# Patient Record
Sex: FEMALE | Race: WHITE | NOT HISPANIC OR LATINO | Employment: OTHER | URBAN - NONMETROPOLITAN AREA
[De-identification: names, ages, dates, MRNs, and addresses within clinical notes are randomized per-mention and may not be internally consistent; named-entity substitution may affect disease eponyms.]

---

## 2018-01-18 ENCOUNTER — TRANSCRIBE ORDERS (OUTPATIENT)
Dept: ADMINISTRATIVE | Facility: HOSPITAL | Age: 53
End: 2018-01-18

## 2018-01-18 DIAGNOSIS — R92.8 ABNORMALITY OF LEFT BREAST ON SCREENING MAMMOGRAM: Primary | ICD-10-CM

## 2022-12-08 ENCOUNTER — LAB (OUTPATIENT)
Dept: LAB | Facility: HOSPITAL | Age: 57
End: 2022-12-08

## 2022-12-08 ENCOUNTER — OFFICE VISIT (OUTPATIENT)
Dept: INTERNAL MEDICINE | Facility: CLINIC | Age: 57
End: 2022-12-08

## 2022-12-08 VITALS
TEMPERATURE: 97.1 F | OXYGEN SATURATION: 98 % | SYSTOLIC BLOOD PRESSURE: 132 MMHG | WEIGHT: 153.4 LBS | RESPIRATION RATE: 16 BRPM | DIASTOLIC BLOOD PRESSURE: 66 MMHG | HEART RATE: 92 BPM | HEIGHT: 65 IN | BODY MASS INDEX: 25.56 KG/M2

## 2022-12-08 DIAGNOSIS — F32.A DEPRESSION, UNSPECIFIED DEPRESSION TYPE: ICD-10-CM

## 2022-12-08 DIAGNOSIS — E55.9 VITAMIN D DEFICIENCY: ICD-10-CM

## 2022-12-08 DIAGNOSIS — E11.9 TYPE 2 DIABETES MELLITUS WITHOUT COMPLICATION, WITH LONG-TERM CURRENT USE OF INSULIN: ICD-10-CM

## 2022-12-08 DIAGNOSIS — Z12.31 SCREENING MAMMOGRAM, ENCOUNTER FOR: ICD-10-CM

## 2022-12-08 DIAGNOSIS — B96.89 ACUTE BACTERIAL BRONCHITIS: ICD-10-CM

## 2022-12-08 DIAGNOSIS — Z23 NEED FOR COVID-19 VACCINE: ICD-10-CM

## 2022-12-08 DIAGNOSIS — Z00.00 HEALTH CARE MAINTENANCE: ICD-10-CM

## 2022-12-08 DIAGNOSIS — J20.8 ACUTE BACTERIAL BRONCHITIS: ICD-10-CM

## 2022-12-08 DIAGNOSIS — Z12.11 ENCOUNTER FOR SCREENING COLONOSCOPY: ICD-10-CM

## 2022-12-08 DIAGNOSIS — Z79.4 TYPE 2 DIABETES MELLITUS WITHOUT COMPLICATION, WITH LONG-TERM CURRENT USE OF INSULIN: ICD-10-CM

## 2022-12-08 DIAGNOSIS — R25.2 LEG CRAMPS: ICD-10-CM

## 2022-12-08 DIAGNOSIS — G47.00 INSOMNIA, UNSPECIFIED TYPE: ICD-10-CM

## 2022-12-08 DIAGNOSIS — Z01.00 ENCOUNTER FOR DIABETES TYPE 2 EYE EXAM: ICD-10-CM

## 2022-12-08 DIAGNOSIS — N39.0 ACUTE UTI: ICD-10-CM

## 2022-12-08 DIAGNOSIS — F19.11 HISTORY OF DRUG ABUSE IN REMISSION: ICD-10-CM

## 2022-12-08 DIAGNOSIS — K21.9 GASTROESOPHAGEAL REFLUX DISEASE, UNSPECIFIED WHETHER ESOPHAGITIS PRESENT: ICD-10-CM

## 2022-12-08 DIAGNOSIS — I10 PRIMARY HYPERTENSION: Primary | ICD-10-CM

## 2022-12-08 DIAGNOSIS — Z23 NEED FOR SHINGLES VACCINE: ICD-10-CM

## 2022-12-08 DIAGNOSIS — Z12.4 ENCOUNTER FOR PAPANICOLAOU SMEAR OF CERVIX: ICD-10-CM

## 2022-12-08 DIAGNOSIS — R11.0 CHRONIC NAUSEA: ICD-10-CM

## 2022-12-08 DIAGNOSIS — Z23 NEED FOR INFLUENZA VACCINATION: ICD-10-CM

## 2022-12-08 DIAGNOSIS — E11.9 ENCOUNTER FOR DIABETES TYPE 2 EYE EXAM: ICD-10-CM

## 2022-12-08 DIAGNOSIS — Z23 NEED FOR TDAP VACCINATION: ICD-10-CM

## 2022-12-08 LAB
ALBUMIN SERPL-MCNC: 4.5 G/DL (ref 3.5–5.2)
ALBUMIN/GLOB SERPL: 1.2 G/DL
ALP SERPL-CCNC: 149 U/L (ref 39–117)
ALT SERPL W P-5'-P-CCNC: 9 U/L (ref 1–33)
ANION GAP SERPL CALCULATED.3IONS-SCNC: 11 MMOL/L (ref 5–15)
AST SERPL-CCNC: 24 U/L (ref 1–32)
BASOPHILS # BLD AUTO: 0.06 10*3/MM3 (ref 0–0.2)
BASOPHILS NFR BLD AUTO: 0.8 % (ref 0–1.5)
BILIRUB BLD-MCNC: ABNORMAL MG/DL
BILIRUB SERPL-MCNC: 0.2 MG/DL (ref 0–1.2)
BUN SERPL-MCNC: 17 MG/DL (ref 6–20)
BUN/CREAT SERPL: 21 (ref 7–25)
CALCIUM SPEC-SCNC: 10 MG/DL (ref 8.6–10.5)
CHLORIDE SERPL-SCNC: 102 MMOL/L (ref 98–107)
CHOLEST SERPL-MCNC: 244 MG/DL (ref 0–200)
CLARITY, POC: CLEAR
CO2 SERPL-SCNC: 24 MMOL/L (ref 22–29)
COLOR UR: ABNORMAL
CREAT SERPL-MCNC: 0.81 MG/DL (ref 0.57–1)
DEPRECATED RDW RBC AUTO: 37.9 FL (ref 37–54)
EGFRCR SERPLBLD CKD-EPI 2021: 85.9 ML/MIN/1.73
EOSINOPHIL # BLD AUTO: 0.21 10*3/MM3 (ref 0–0.4)
EOSINOPHIL NFR BLD AUTO: 2.7 % (ref 0.3–6.2)
ERYTHROCYTE [DISTWIDTH] IN BLOOD BY AUTOMATED COUNT: 12.4 % (ref 12.3–15.4)
EXPIRATION DATE: ABNORMAL
GLOBULIN UR ELPH-MCNC: 3.7 GM/DL
GLUCOSE SERPL-MCNC: 235 MG/DL (ref 65–99)
GLUCOSE UR STRIP-MCNC: ABNORMAL MG/DL
HBA1C MFR BLD: 6.8 % (ref 4.8–5.6)
HCT VFR BLD AUTO: 39.6 % (ref 34–46.6)
HDLC SERPL-MCNC: 26 MG/DL (ref 40–60)
HGB BLD-MCNC: 12.7 G/DL (ref 12–15.9)
IMM GRANULOCYTES # BLD AUTO: 0.02 10*3/MM3 (ref 0–0.05)
IMM GRANULOCYTES NFR BLD AUTO: 0.3 % (ref 0–0.5)
KETONES UR QL: ABNORMAL
LDLC SERPL CALC-MCNC: 145 MG/DL (ref 0–100)
LDLC/HDLC SERPL: 5.38 {RATIO}
LEUKOCYTE EST, POC: ABNORMAL
LYMPHOCYTES # BLD AUTO: 3.38 10*3/MM3 (ref 0.7–3.1)
LYMPHOCYTES NFR BLD AUTO: 43.9 % (ref 19.6–45.3)
Lab: ABNORMAL
MCH RBC QN AUTO: 26.9 PG (ref 26.6–33)
MCHC RBC AUTO-ENTMCNC: 32.1 G/DL (ref 31.5–35.7)
MCV RBC AUTO: 83.9 FL (ref 79–97)
MONOCYTES # BLD AUTO: 0.36 10*3/MM3 (ref 0.1–0.9)
MONOCYTES NFR BLD AUTO: 4.7 % (ref 5–12)
NEUTROPHILS NFR BLD AUTO: 3.67 10*3/MM3 (ref 1.7–7)
NEUTROPHILS NFR BLD AUTO: 47.6 % (ref 42.7–76)
NITRITE UR-MCNC: POSITIVE MG/ML
NRBC BLD AUTO-RTO: 0 /100 WBC (ref 0–0.2)
PH UR: 5 [PH] (ref 5–8)
PLATELET # BLD AUTO: 252 10*3/MM3 (ref 140–450)
PMV BLD AUTO: 9.5 FL (ref 6–12)
POTASSIUM SERPL-SCNC: 4.6 MMOL/L (ref 3.5–5.2)
PROT SERPL-MCNC: 8.2 G/DL (ref 6–8.5)
PROT UR STRIP-MCNC: ABNORMAL MG/DL
RBC # BLD AUTO: 4.72 10*6/MM3 (ref 3.77–5.28)
RBC # UR STRIP: ABNORMAL /UL
SODIUM SERPL-SCNC: 137 MMOL/L (ref 136–145)
SP GR UR: 1.03 (ref 1–1.03)
TRIGL SERPL-MCNC: 390 MG/DL (ref 0–150)
TSH SERPL DL<=0.05 MIU/L-ACNC: 0.27 UIU/ML (ref 0.27–4.2)
UROBILINOGEN UR QL: ABNORMAL
VLDLC SERPL-MCNC: 73 MG/DL (ref 5–40)
WBC NRBC COR # BLD: 7.7 10*3/MM3 (ref 3.4–10.8)

## 2022-12-08 PROCEDURE — 80061 LIPID PANEL: CPT | Performed by: PHYSICIAN ASSISTANT

## 2022-12-08 PROCEDURE — 87088 URINE BACTERIA CULTURE: CPT | Performed by: PHYSICIAN ASSISTANT

## 2022-12-08 PROCEDURE — 36415 COLL VENOUS BLD VENIPUNCTURE: CPT | Performed by: PHYSICIAN ASSISTANT

## 2022-12-08 PROCEDURE — 99204 OFFICE O/P NEW MOD 45 MIN: CPT | Performed by: PHYSICIAN ASSISTANT

## 2022-12-08 PROCEDURE — 86803 HEPATITIS C AB TEST: CPT | Performed by: PHYSICIAN ASSISTANT

## 2022-12-08 PROCEDURE — 83036 HEMOGLOBIN GLYCOSYLATED A1C: CPT | Performed by: PHYSICIAN ASSISTANT

## 2022-12-08 PROCEDURE — 82306 VITAMIN D 25 HYDROXY: CPT | Performed by: PHYSICIAN ASSISTANT

## 2022-12-08 PROCEDURE — 87086 URINE CULTURE/COLONY COUNT: CPT | Performed by: PHYSICIAN ASSISTANT

## 2022-12-08 PROCEDURE — 80050 GENERAL HEALTH PANEL: CPT | Performed by: PHYSICIAN ASSISTANT

## 2022-12-08 PROCEDURE — 87186 SC STD MICRODIL/AGAR DIL: CPT | Performed by: PHYSICIAN ASSISTANT

## 2022-12-08 RX ORDER — NYSTATIN 100000 U/G
CREAM TOPICAL
COMMUNITY

## 2022-12-08 RX ORDER — PROMETHAZINE HYDROCHLORIDE 25 MG/1
SUPPOSITORY RECTAL
COMMUNITY
Start: 2022-09-23 | End: 2022-12-08

## 2022-12-08 RX ORDER — TRAZODONE HYDROCHLORIDE 100 MG/1
100 TABLET ORAL NIGHTLY PRN
COMMUNITY
Start: 2022-11-01 | End: 2022-12-08 | Stop reason: SDUPTHER

## 2022-12-08 RX ORDER — METHYLPREDNISOLONE 4 MG/1
TABLET ORAL
Qty: 21 TABLET | Refills: 0 | Status: SHIPPED | OUTPATIENT
Start: 2022-12-08 | End: 2022-12-13

## 2022-12-08 RX ORDER — TIZANIDINE 4 MG/1
4 TABLET ORAL EVERY 8 HOURS PRN
Qty: 90 TABLET | Refills: 0 | Status: SHIPPED | OUTPATIENT
Start: 2022-12-08 | End: 2023-01-03

## 2022-12-08 RX ORDER — PANTOPRAZOLE SODIUM 40 MG/1
40 TABLET, DELAYED RELEASE ORAL EVERY MORNING
Qty: 90 TABLET | Refills: 1 | Status: SHIPPED | OUTPATIENT
Start: 2022-12-08

## 2022-12-08 RX ORDER — ATENOLOL 50 MG/1
50 TABLET ORAL DAILY
Qty: 90 TABLET | Refills: 1 | Status: SHIPPED | OUTPATIENT
Start: 2022-12-08

## 2022-12-08 RX ORDER — ALBUTEROL SULFATE 90 UG/1
2 AEROSOL, METERED RESPIRATORY (INHALATION) EVERY 4 HOURS PRN
Qty: 18 G | Refills: 0 | Status: SHIPPED | OUTPATIENT
Start: 2022-12-08

## 2022-12-08 RX ORDER — IBUPROFEN 800 MG/1
800 TABLET ORAL EVERY 8 HOURS PRN
Qty: 90 TABLET | Refills: 1 | Status: SHIPPED | OUTPATIENT
Start: 2022-12-08 | End: 2023-03-07

## 2022-12-08 RX ORDER — LISINOPRIL 10 MG/1
10 TABLET ORAL DAILY
Qty: 90 TABLET | Refills: 1 | Status: SHIPPED | OUTPATIENT
Start: 2022-12-08

## 2022-12-08 RX ORDER — TRAZODONE HYDROCHLORIDE 100 MG/1
100 TABLET ORAL NIGHTLY PRN
Qty: 90 TABLET | Refills: 1 | Status: SHIPPED | OUTPATIENT
Start: 2022-12-08 | End: 2023-01-24 | Stop reason: SDUPTHER

## 2022-12-08 RX ORDER — CHOLECALCIFEROL (VITAMIN D3) 125 MCG
50 CAPSULE ORAL DAILY
Qty: 90 TABLET | Refills: 1 | Status: SHIPPED | OUTPATIENT
Start: 2022-12-08

## 2022-12-08 RX ORDER — HYDROXYZINE 50 MG/1
TABLET, FILM COATED ORAL
COMMUNITY
Start: 2022-10-20 | End: 2022-12-08 | Stop reason: SDUPTHER

## 2022-12-08 RX ORDER — AZITHROMYCIN 250 MG/1
TABLET, FILM COATED ORAL
Qty: 6 TABLET | Refills: 0 | Status: SHIPPED | OUTPATIENT
Start: 2022-12-08 | End: 2022-12-08

## 2022-12-08 RX ORDER — FAMOTIDINE 20 MG/1
20 TABLET, FILM COATED ORAL EVERY MORNING
Qty: 90 TABLET | Refills: 1 | Status: SHIPPED | OUTPATIENT
Start: 2022-12-08

## 2022-12-08 RX ORDER — PHENAZOPYRIDINE HYDROCHLORIDE 100 MG/1
100 TABLET, FILM COATED ORAL 3 TIMES DAILY PRN
Qty: 30 TABLET | Refills: 0 | Status: SHIPPED | OUTPATIENT
Start: 2022-12-08 | End: 2023-01-24 | Stop reason: SDUPTHER

## 2022-12-08 RX ORDER — ATENOLOL 50 MG/1
TABLET ORAL
COMMUNITY
Start: 2022-10-24 | End: 2022-12-08 | Stop reason: SDUPTHER

## 2022-12-08 RX ORDER — INSULIN LISPRO 100 [IU]/ML
INJECTION, SOLUTION INTRAVENOUS; SUBCUTANEOUS
Qty: 100 ML | Refills: 3 | Status: SHIPPED | OUTPATIENT
Start: 2022-12-08

## 2022-12-08 RX ORDER — PANTOPRAZOLE SODIUM 40 MG/1
40 TABLET, DELAYED RELEASE ORAL EVERY MORNING
COMMUNITY
Start: 2022-10-25 | End: 2022-12-08 | Stop reason: SDUPTHER

## 2022-12-08 RX ORDER — PROMETHAZINE HYDROCHLORIDE 25 MG/1
25 TABLET ORAL EVERY 6 HOURS PRN
Qty: 30 TABLET | Refills: 0 | Status: SHIPPED | OUTPATIENT
Start: 2022-12-08 | End: 2022-12-19

## 2022-12-08 RX ORDER — HYDROXYZINE 50 MG/1
50 TABLET, FILM COATED ORAL EVERY 8 HOURS PRN
Qty: 270 TABLET | Refills: 1 | Status: SHIPPED | OUTPATIENT
Start: 2022-12-08

## 2022-12-08 RX ORDER — FAMOTIDINE 20 MG/1
20 TABLET, FILM COATED ORAL EVERY MORNING
COMMUNITY
Start: 2022-10-25 | End: 2022-12-08 | Stop reason: SDUPTHER

## 2022-12-08 RX ORDER — TIZANIDINE 4 MG/1
TABLET ORAL
COMMUNITY
Start: 2022-11-07 | End: 2022-12-08 | Stop reason: SDUPTHER

## 2022-12-08 RX ORDER — SULFAMETHOXAZOLE AND TRIMETHOPRIM 800; 160 MG/1; MG/1
1 TABLET ORAL 2 TIMES DAILY
Qty: 20 TABLET | Refills: 0 | Status: SHIPPED | OUTPATIENT
Start: 2022-12-08 | End: 2022-12-18

## 2022-12-08 NOTE — PROGRESS NOTES
[facility] PRIMARY CARE  [address]  [facility] [address]  Dept: [phone]  Dept Fax: [phone]  Loc: [phone]  Loc Fax: [phone]    Marta Polanco  1965    New Patient Office Note    History of Present Illness:  Patient is a 57-year-old female in today to establish care for hypertension, diabetes, GERD, insomnia, leg cramps, depression, vitamin D deficiency, and acutely for chest congestion and dysuria.  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  Celebrating 3 months of recovery now.  In a program do to graduate soon.  Patient fully intends on maintaining sobriety.  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  Patient living in a recovery home at the moment.  All medications are monitored and distributed as directed per prescription.  Patient on atenolol 50 mg daily and lisinopril 10 mg daily for high blood pressure.  Taking as directed without any problems or side effects.  Blood pressure running normal.    Patient on sliding scale insulin and Lantus long-acting daily for type 2 diabetes.  Taking as directed without any problems or side effects.  Needing A1c rechecked.  Blood sugars normal at home.    Patient on pantoprazole and famotidine for GERD.  Taking as directed without any problems or side effects.  Reports good symptomatic control.  Requesting refill of medications.    Patient has insomnia.  On trazodone 100 mg nightly as needed.  Taking as directed without any problems or side effects.  Overall insomnia well controlled on this dose.    Patient continues to struggle with leg cramps.  Needing labs rechecked.  Requesting refill of muscle relaxer.  On tizanidine 4 mg 3 times daily as needed.    Patient with vitamin D deficiency.  On vitamin D daily.  Requesting refill.  Needing vitamin D level rechecked.    Patient reports a 2-week worsening history of cough and chest congestion.   Patient used to smoke but now vapes.  Patient coughing up thick, green mucus.  Has some wheezing.  Denies any shortness of breath.  Denies any fever or chills.    Patient with a history of UTIs that are recurrent.  Has had several over the last year.  Having dysuria again.  Recently was on antibiotic which was Macrobid but did not work.  Has not had follow-up of this.  Still having burning with urination.        The following portions of the patient's history were reviewed and updated as appropriate: allergies, current medications, past family history, past medical history, past social history, past surgical history, and problem list.    Medications:    Current Outpatient Medications:   •  atenolol (TENORMIN) 50 MG tablet, Take 1 tablet by mouth Daily., Disp: 90 tablet, Rfl: 1  •  famotidine (PEPCID) 20 MG tablet, Take 1 tablet by mouth Every Morning., Disp: 90 tablet, Rfl: 1  •  hydrOXYzine (ATARAX) 50 MG tablet, Take 1 tablet by mouth Every 8 (Eight) Hours As Needed for Itching., Disp: 270 tablet, Rfl: 1  •  nystatin (MYCOSTATIN) 858475 UNIT/GM cream, nystatin 100,000 unit/gram topical cream  APPLY TO THE AFFECTED AREA(S) BY TOPICAL ROUTE 2 TIMES PER DAY, Disp: , Rfl:   •  pantoprazole (PROTONIX) 40 MG EC tablet, Take 1 tablet by mouth Every Morning., Disp: 90 tablet, Rfl: 1  •  tiZANidine (ZANAFLEX) 4 MG tablet, Take 1 tablet by mouth Every 8 (Eight) Hours As Needed for Muscle Spasms., Disp: 90 tablet, Rfl: 0  •  traZODone (DESYREL) 100 MG tablet, Take 1 tablet by mouth At Night As Needed for Sleep., Disp: 90 tablet, Rfl: 1  •  albuterol sulfate  (90 Base) MCG/ACT inhaler, Inhale 2 puffs Every 4 (Four) Hours As Needed for Wheezing., Disp: 18 g, Rfl: 0  •  azithromycin (Zithromax Z-Anderson) 250 MG tablet, Take 2 tablets by mouth on day 1, then 1 tablet daily on days 2-5, Disp: 6 tablet, Rfl: 0  •  Cholecalciferol (Vitamin D3) 50 MCG (2000 UT) tablet, Take 1 tablet by mouth Daily., Disp: 90 tablet, Rfl: 1  •   ibuprofen (ADVIL,MOTRIN) 800 MG tablet, Take 1 tablet by mouth Every 8 (Eight) Hours As Needed for Mild Pain., Disp: 90 tablet, Rfl: 1  •  Insulin Glargine (LANTUS SOLOSTAR) 100 UNIT/ML injection pen, Inject 15 Units under the skin into the appropriate area as directed Every Night., Disp: 100 mL, Rfl: 3  •  Insulin Lispro, 1 Unit Dial, (HUMALOG) 100 UNIT/ML solution pen-injector, Inject into skin 3 times daily with meals as directed per sliding scale provided. Do not exceed 40 units in 24 hours., Disp: 100 mL, Rfl: 3  •  lisinopril (PRINIVIL,ZESTRIL) 10 MG tablet, Take 1 tablet by mouth Daily., Disp: 90 tablet, Rfl: 1  •  methylPREDNISolone (MEDROL) 4 MG dose pack, Take as directed on package instructions - 6 day taper., Disp: 21 tablet, Rfl: 0  •  promethazine (PHENERGAN) 25 MG tablet, Take 1 tablet by mouth Every 6 (Six) Hours As Needed for Nausea or Vomiting., Disp: 30 tablet, Rfl: 0  •  sertraline (Zoloft) 50 MG tablet, Take 1 tablet by mouth Daily., Disp: 90 tablet, Rfl: 1    Subjective  Allergies   Allergen Reactions   • Ambien [Zolpidem] Other (See Comments)   • Ketorolac Hives and Shortness Of Breath   • Ketorolac Tromethamine Anaphylaxis   • Tetracycline Rash   • Tramadol Hives and Shortness Of Breath   • Sumatriptan Dizziness   • Sulfa Antibiotics Hives   • Penicillins Rash        Past Medical History:   Diagnosis Date   • Anxiety 89    I have PTSD. 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   • Urinary tract infection 11/15/22   • Visual impairment 3/4/99       Past Surgical History:   Procedure Laterality Date   • APPENDECTOMY  84   •  SECTION  4/10/84 2/3/86    Had 2 daughter's    • CHOLECYSTECTOMY  4/17/84   • COLONOSCOPY  8/8/20   • HERNIA REPAIR  5/9/78   • HYSTERECTOMY  5/6/96       Family History   Family history unknown: Yes        Social History     Socioeconomic History   • Marital status:    Tobacco Use   • Smoking status: Every Day     Packs/day: 1.00     Years: 15.00     Pack years: 15.00     Types: Cigarettes, Electronic Cigarette   Substance and Sexual Activity   • Alcohol use: Never   • Drug use: Not Currently     Types: Barbiturates, Benzodiazepines, Heroin, Marijuana   • Sexual activity: Not Currently     Partners: Male     Birth control/protection: None, Hysterectomy       Review of Systems   Constitutional: Positive for fatigue. Negative for activity change, chills, fever and unexpected weight change.   HENT: Positive for hearing loss. Negative for congestion, ear pain, postnasal drip, sinus pressure and sore throat.    Eyes: Positive for visual disturbance. Negative for pain, discharge and redness.   Respiratory: Positive for cough and wheezing. Negative for shortness of breath.    Cardiovascular: Negative for chest pain, palpitations and leg swelling.   Gastrointestinal: Positive for abdominal pain, constipation, nausea and vomiting. Negative for diarrhea.   Endocrine: Positive for cold intolerance and heat intolerance.   Genitourinary: Positive for dysuria and urgency. Negative for decreased urine volume.   Musculoskeletal: Positive for arthralgias and myalgias.   Skin: Negative for rash and wound.   Neurological: Positive for headaches. Negative for dizziness and light-headedness.   Hematological: Does not bruise/bleed easily.   Psychiatric/Behavioral: Positive for confusion and decreased concentration. Negative for dysphoric mood, self-injury, sleep disturbance and suicidal ideas. The patient is not nervous/anxious.        Objective  Vitals:    12/08/22 1017   BP: 132/66   BP Location: Left arm   Patient Position: Sitting   Cuff Size: Large Adult   Pulse: 92  "  Resp: 16   Temp: 97.1 °F (36.2 °C)   SpO2: 98%   Weight: 69.6 kg (153 lb 6.4 oz)   Height: 165.6 cm (65.2\")       Physical Exam  Physical Exam  Vitals and nursing note reviewed.   Constitutional:       General: She is not in acute distress.     Appearance: She is not ill-appearing.   HENT:      Head: Normocephalic.      Right Ear: Tympanic membrane, ear canal and external ear normal. There is no impacted cerumen.      Left Ear: Tympanic membrane, ear canal and external ear normal. There is no impacted cerumen.      Nose: No congestion or rhinorrhea.      Mouth/Throat:      Mouth: Mucous membranes are moist.      Pharynx: Oropharynx is clear. No oropharyngeal exudate or posterior oropharyngeal erythema.   Eyes:      General:         Right eye: No discharge.         Left eye: No discharge.      Extraocular Movements: Extraocular movements intact.      Conjunctiva/sclera: Conjunctivae normal.      Pupils: Pupils are equal, round, and reactive to light.   Cardiovascular:      Rate and Rhythm: Normal rate and regular rhythm.      Heart sounds: Normal heart sounds. No murmur heard.    No friction rub. No gallop.   Pulmonary:      Effort: Pulmonary effort is normal. No respiratory distress.      Breath sounds: Wheezing present.   Abdominal:      General: Bowel sounds are normal. There is no distension.      Palpations: Abdomen is soft. There is no mass.      Tenderness: There is no abdominal tenderness.   Musculoskeletal:         General: Tenderness present. No swelling. Normal range of motion.      Cervical back: Normal range of motion. No tenderness.      Right lower leg: No edema.      Left lower leg: No edema.   Lymphadenopathy:      Cervical: No cervical adenopathy.   Skin:     Findings: No bruising, erythema or rash.   Neurological:      Mental Status: She is oriented to person, place, and time.      Gait: Gait normal.   Psychiatric:         Mood and Affect: Mood normal.         Behavior: Behavior normal.         " Thought Content: Thought content normal.         Judgment: Judgment normal.         Diagnostic Data  Procedures    Assessment  Diagnoses and all orders for this visit:    1. Primary hypertension (Primary)    2. Type 2 diabetes mellitus without complication, with long-term current use of insulin (HCC)    3. Gastroesophageal reflux disease, unspecified whether esophagitis present    4. Screening mammogram, encounter for  -     Mammo screening digital tomosynthesis bilateral w CAD; Future    5. Encounter for screening colonoscopy  -     Ambulatory Referral to Gastroenterology    6. Need for Tdap vaccination    7. Need for COVID-19 vaccine    8. Need for influenza vaccination    9. Need for shingles vaccine    10. Health care maintenance  -     CBC w AUTO Differential; Future  -     Comprehensive Metabolic Panel  -     TSH; Future  -     Hemoglobin A1c; Future  -     Lipid Panel  -     Hepatitis C Antibody  -     CBC w AUTO Differential  -     TSH  -     Hemoglobin A1c    11. Encounter for Papanicolaou smear of cervix  -     Ambulatory Referral to Obstetrics / Gynecology    12. Insomnia, unspecified type    13. Leg cramps  -     Vitamin D 25 hydroxy; Future  -     Vitamin D 25 hydroxy    14. Depression, unspecified depression type  -     Ambulatory Referral to Psychology  -     Ambulatory Referral to Psychiatry    15. Vitamin D deficiency    16. Encounter for diabetes type 2 eye exam (HCC)  -     Ambulatory Referral to Ophthalmology    17. History of drug abuse in remission ([facility])    18. Acute bacterial bronchitis  -     XR Chest PA & Lateral; Future    19. Chronic nausea    20. Acute UTI  -     Urine Culture - Urine, Urine, Clean Catch; Future    Other orders  -     atenolol (TENORMIN) 50 MG tablet; Take 1 tablet by mouth Daily.  Dispense: 90 tablet; Refill: 1  -     lisinopril (PRINIVIL,ZESTRIL) 10 MG tablet; Take 1 tablet by mouth Daily.  Dispense: 90 tablet; Refill: 1  -     famotidine (PEPCID) 20 MG tablet; Take 1  tablet by mouth Every Morning.  Dispense: 90 tablet; Refill: 1  -     hydrOXYzine (ATARAX) 50 MG tablet; Take 1 tablet by mouth Every 8 (Eight) Hours As Needed for Itching.  Dispense: 270 tablet; Refill: 1  -     pantoprazole (PROTONIX) 40 MG EC tablet; Take 1 tablet by mouth Every Morning.  Dispense: 90 tablet; Refill: 1  -     promethazine (PHENERGAN) 25 MG tablet; Take 1 tablet by mouth Every 6 (Six) Hours As Needed for Nausea or Vomiting.  Dispense: 30 tablet; Refill: 0  -     tiZANidine (ZANAFLEX) 4 MG tablet; Take 1 tablet by mouth Every 8 (Eight) Hours As Needed for Muscle Spasms.  Dispense: 90 tablet; Refill: 0  -     Insulin Lispro, 1 Unit Dial, (HUMALOG) 100 UNIT/ML solution pen-injector; Inject into skin 3 times daily with meals as directed per sliding scale provided. Do not exceed 40 units in 24 hours.  Dispense: 100 mL; Refill: 3  -     Insulin Glargine (LANTUS SOLOSTAR) 100 UNIT/ML injection pen; Inject 15 Units under the skin into the appropriate area as directed Every Night.  Dispense: 100 mL; Refill: 3  -     traZODone (DESYREL) 100 MG tablet; Take 1 tablet by mouth At Night As Needed for Sleep.  Dispense: 90 tablet; Refill: 1  -     Cholecalciferol (Vitamin D3) 50 MCG (2000 UT) tablet; Take 1 tablet by mouth Daily.  Dispense: 90 tablet; Refill: 1  -     sertraline (Zoloft) 50 MG tablet; Take 1 tablet by mouth Daily.  Dispense: 90 tablet; Refill: 1  -     ibuprofen (ADVIL,MOTRIN) 800 MG tablet; Take 1 tablet by mouth Every 8 (Eight) Hours As Needed for Mild Pain.  Dispense: 90 tablet; Refill: 1  -     azithromycin (Zithromax Z-Anderson) 250 MG tablet; Take 2 tablets by mouth on day 1, then 1 tablet daily on days 2-5  Dispense: 6 tablet; Refill: 0  -     albuterol sulfate  (90 Base) MCG/ACT inhaler; Inhale 2 puffs Every 4 (Four) Hours As Needed for Wheezing.  Dispense: 18 g; Refill: 0  -     methylPREDNISolone (MEDROL) 4 MG dose pack; Take as directed on package instructions - 6 day taper.   Dispense: 21 tablet; Refill: 0        Plan    1. Primary hypertension (Primary)- well controlled on atenolol 50 mg daily and lisinopril 10 mg daily.  Keep same.  Refilled meds.  Continue to monitor.    2. Type 2 diabetes mellitus without complication, with long-term current use of insulin (HCC)- seems well controlled on sliding scale and long-acting insulin.  Keep same.  Refilled medicine.  Continue to monitor.  Repeat A1c.    3. Gastroesophageal reflux disease, unspecified whether esophagitis present- well controlled on Protonix and famotidine.  Keep same.  Refilled meds.  Continue to monitor.    4. Screening mammogram, encounter for- ordered mammogram.    5. Encounter for screening colonoscopy- Ambulatory Referral to Gastroenterology    6. Need for Tdap vaccination- declined.    7. Need for COVID-19 vaccine- declined.    8. Need for influenza vaccination- declined.    9. Need for shingles vaccine- declined.    10. Health care maintenance- obtain fasting labs.    11. Encounter for Papanicolaou smear of cervix- Ambulatory Referral to Obstetrics / Gynecology    12. Insomnia, unspecified type- well controlled on trazodone 100 mg nightly as needed.  Keep same.  Refilled meds.  Continue to monitor.    13. Leg cramps- doing ok on Zanaflex 4 mg 3 times daily as needed.  Keep same.  Continue meds.  Continue to monitor.    14. Depression, unspecified depression type- on zoloft 50 mg daily.  Referred to psychology and psychiatry.    15. Vitamin D deficiency- repeat vitamin D level and refill of vitamin D.    16. Encounter for diabetes type 2 eye exam ([facility])- Ambulatory Referral to Ophthalmology    17. History of drug abuse in remission ([facility])- continuing to celebrate and maintain sobriety.    18. Acute bacterial bronchitis- worse, sent in Bactrim for this and for acute UTI.  Obtain chest x-ray.  Medrol Dosepak as directed.  Albuterol 2 puffs every 6 hours as needed.  If not better or worse in 2 to 3 days go to ER immediately.  Patient verbalized understanding of all instructions given and complied.    19. Chronic nausea- ok on Phenergan.  Refilled med.  Continue to monitor.    20. Acute UTI- worse, UA in office was positive for leuks, blood, protein.  Sent in Bactrim.  Macrobid was ineffective.  Obtain culture and sensitivity.      Return in about 2 weeks (around 12/22/2022) for Recheck.    Chris Hogan PA-C  12/08/2022

## 2022-12-09 ENCOUNTER — TELEPHONE (OUTPATIENT)
Dept: INTERNAL MEDICINE | Facility: CLINIC | Age: 57
End: 2022-12-09

## 2022-12-09 LAB
25(OH)D3 SERPL-MCNC: 37.6 NG/ML (ref 30–100)
HCV AB SER DONR QL: NORMAL

## 2022-12-09 RX ORDER — ATORVASTATIN CALCIUM 10 MG/1
10 TABLET, FILM COATED ORAL NIGHTLY
Qty: 30 TABLET | Refills: 5 | Status: SHIPPED | OUTPATIENT
Start: 2022-12-09

## 2022-12-09 NOTE — TELEPHONE ENCOUNTER
HUB TO READ MESSAGE BELOW  ----- Message from Chris Hogan PA-C sent at 12/9/2022 10:28 AM EST -----  Attempted to contact patient regarding results, no answer, left voicemail to call back.  Cholesterol high, started on atorvastatin 10 mg nightly.  A1c looking good, continue current regimen.

## 2022-12-09 NOTE — TELEPHONE ENCOUNTER
HUB TO READ MESSAGE BELOW  ----- Message from Chris Hogan PA-C sent at 12/9/2022 10:28 AM EST -----  Attempted to contact patient regarding results, no answer, left voicemail to call back.  Cholesterol high, started on atorvastatin 10 mg nightly.  A1c looking good, continue current regimen.    HUB RELAYED MESSAGE

## 2022-12-10 LAB — BACTERIA SPEC AEROBE CULT: ABNORMAL

## 2022-12-12 ENCOUNTER — TELEPHONE (OUTPATIENT)
Dept: INTERNAL MEDICINE | Facility: CLINIC | Age: 57
End: 2022-12-12

## 2022-12-12 NOTE — TELEPHONE ENCOUNTER
----- Message from Chris Hogan PA-C sent at 12/10/2022  7:50 PM EST -----  Culture pos for E. Coli. Cont bactrim as this is covered for this infection. Is she feeling better?

## 2022-12-12 NOTE — TELEPHONE ENCOUNTER
Hub to read and can relay message      Attempted to reach patient no answer lvm to call the office about urine culture. Please let patient know that Culture pos for E. Coli. Cont bactrim as this is covered for this infection. Is she feeling better?

## 2022-12-19 RX ORDER — PROMETHAZINE HYDROCHLORIDE 25 MG/1
25 TABLET ORAL EVERY 6 HOURS PRN
Qty: 30 TABLET | Refills: 0 | Status: SHIPPED | OUTPATIENT
Start: 2022-12-19 | End: 2023-01-03

## 2023-01-03 RX ORDER — TIZANIDINE 4 MG/1
4 TABLET ORAL EVERY 8 HOURS PRN
Qty: 90 TABLET | Refills: 0 | Status: SHIPPED | OUTPATIENT
Start: 2023-01-03 | End: 2023-02-13 | Stop reason: SDUPTHER

## 2023-01-03 RX ORDER — PROMETHAZINE HYDROCHLORIDE 25 MG/1
25 TABLET ORAL EVERY 6 HOURS PRN
Qty: 30 TABLET | Refills: 0 | Status: SHIPPED | OUTPATIENT
Start: 2023-01-03 | End: 2023-01-24 | Stop reason: SDUPTHER

## 2023-01-10 ENCOUNTER — TELEPHONE (OUTPATIENT)
Dept: INTERNAL MEDICINE | Facility: CLINIC | Age: 58
End: 2023-01-10
Payer: COMMERCIAL

## 2023-01-10 NOTE — TELEPHONE ENCOUNTER
Caller: Polanco Marta    Relationship: Self    Best call back number: [phone]    What medication are you requesting: SUPPOSITORIES,  ANTIBIOTICS, AND PERIDIUM     What are your current symptoms: DARK URINE, BURNING, AND CANNOT KEEP ANYTHING DOWN    How long have you been experiencing symptoms: 01/08/23     Have you had these symptoms before:    [x] Yes  [] No    Have you been treated for these symptoms before:   [x] Yes  [] No    If a prescription is needed, what is your preferred pharmacy and phone number: [facility] [place], [place] - [facility] [address] - [phone]  - [phone] FX     Additional notes:

## 2023-01-11 NOTE — TELEPHONE ENCOUNTER
PATIENT CALLED IN I RELAYED THE HUB TO READ MESSAGE THE PATIENT SCHEDULED THE FIRST AVAILABLE APPOINTMENT ON 01/16/2023

## 2023-01-11 NOTE — TELEPHONE ENCOUNTER
HUB CAN READ AND SCHEDULE    Called PT to schedule next available appointment for a UTI. PT did not answer. Left VM requesting call back to schedule

## 2023-01-24 ENCOUNTER — OFFICE VISIT (OUTPATIENT)
Dept: INTERNAL MEDICINE | Facility: CLINIC | Age: 58
End: 2023-01-24
Payer: COMMERCIAL

## 2023-01-24 ENCOUNTER — LAB (OUTPATIENT)
Dept: LAB | Facility: HOSPITAL | Age: 58
End: 2023-01-24
Payer: COMMERCIAL

## 2023-01-24 VITALS
TEMPERATURE: 97.1 F | WEIGHT: 169 LBS | HEART RATE: 78 BPM | DIASTOLIC BLOOD PRESSURE: 70 MMHG | BODY MASS INDEX: 27.95 KG/M2 | SYSTOLIC BLOOD PRESSURE: 130 MMHG | OXYGEN SATURATION: 98 % | RESPIRATION RATE: 16 BRPM

## 2023-01-24 DIAGNOSIS — Z79.4 TYPE 2 DIABETES MELLITUS WITHOUT COMPLICATION, WITH LONG-TERM CURRENT USE OF INSULIN: ICD-10-CM

## 2023-01-24 DIAGNOSIS — I10 PRIMARY HYPERTENSION: ICD-10-CM

## 2023-01-24 DIAGNOSIS — N39.0 URINARY TRACT INFECTION WITHOUT HEMATURIA, SITE UNSPECIFIED: Primary | ICD-10-CM

## 2023-01-24 DIAGNOSIS — E11.9 TYPE 2 DIABETES MELLITUS WITHOUT COMPLICATION, WITH LONG-TERM CURRENT USE OF INSULIN: ICD-10-CM

## 2023-01-24 DIAGNOSIS — G47.00 INSOMNIA, UNSPECIFIED TYPE: ICD-10-CM

## 2023-01-24 DIAGNOSIS — K21.9 GASTROESOPHAGEAL REFLUX DISEASE, UNSPECIFIED WHETHER ESOPHAGITIS PRESENT: ICD-10-CM

## 2023-01-24 LAB
BILIRUB BLD-MCNC: NEGATIVE MG/DL
CLARITY, POC: ABNORMAL
COLOR UR: ABNORMAL
EXPIRATION DATE: ABNORMAL
GLUCOSE UR STRIP-MCNC: ABNORMAL MG/DL
KETONES UR QL: NEGATIVE
LEUKOCYTE EST, POC: ABNORMAL
Lab: ABNORMAL
NITRITE UR-MCNC: NEGATIVE MG/ML
PH UR: 6 [PH] (ref 5–8)
PROT UR STRIP-MCNC: ABNORMAL MG/DL
RBC # UR STRIP: NEGATIVE /UL
SP GR UR: 1.01 (ref 1–1.03)
UROBILINOGEN UR QL: NORMAL

## 2023-01-24 PROCEDURE — 99214 OFFICE O/P EST MOD 30 MIN: CPT | Performed by: PHYSICIAN ASSISTANT

## 2023-01-24 PROCEDURE — 87086 URINE CULTURE/COLONY COUNT: CPT | Performed by: PHYSICIAN ASSISTANT

## 2023-01-24 RX ORDER — TRAZODONE HYDROCHLORIDE 150 MG/1
150 TABLET ORAL NIGHTLY PRN
Qty: 90 TABLET | Refills: 1 | Status: SHIPPED | OUTPATIENT
Start: 2023-01-24

## 2023-01-24 RX ORDER — NITROFURANTOIN 25; 75 MG/1; MG/1
100 CAPSULE ORAL 2 TIMES DAILY
Qty: 14 CAPSULE | Refills: 0 | Status: SHIPPED | OUTPATIENT
Start: 2023-01-24 | End: 2023-01-31

## 2023-01-24 RX ORDER — PHENAZOPYRIDINE HYDROCHLORIDE 100 MG/1
100 TABLET, FILM COATED ORAL 3 TIMES DAILY PRN
Qty: 30 TABLET | Refills: 0 | Status: SHIPPED | OUTPATIENT
Start: 2023-01-24

## 2023-01-24 RX ORDER — PROMETHAZINE HYDROCHLORIDE 25 MG/1
25 TABLET ORAL EVERY 6 HOURS PRN
Qty: 30 TABLET | Refills: 0 | Status: SHIPPED | OUTPATIENT
Start: 2023-01-24 | End: 2023-02-06

## 2023-01-24 NOTE — PROGRESS NOTES
[facility] PRIMARY [facility]ARE  [address]  [facility] [address]  Dept: [phone]  Dept Fax: [phone]  Loc: [phone]  Loc Fax: [phone]    Marta Polanco  1965    Follow Up Office Visit Note    History of Present Illness:  Patient is a 57-year-old female in today to follow-up for urinary tract infection, hypertension, diabetes, GERD, and insomnia.  Patient took Bactrim last time as directed for UTI without any problems or side effects and reports symptoms got better but now have returned.  This will be the third or fourth infection this year.  Having dysuria and frequency.  Denies any flank pain, fever, or chills.    Patient on atenolol for high blood pressure.  Taking as directed with any problems or side effects.  Also on lisinopril as directed daily.    Blood sugars doing better, on Lantus and Humalog.    Acid reflux well controlled on pantoprazole.    Patient on 100 mg trazodone nightly for insomnia.  Overall better but still having trouble sleeping.  Would like to increase dose of medicine.        Dysuria   This is a recurrent problem. The current episode started in the past 7 days. The problem occurs every urination. The problem has been waxing and waning. The quality of the pain is described as burning and stabbing. The pain is at a severity of 9/10. The maximum temperature recorded prior to her arrival was 102 - 102.9 F. The fever has been present for 1 - 2 days. She is not sexually active. There is no history of pyelonephritis. Associated symptoms include chills, flank pain, frequency, hesitancy, nausea, sweats, urgency and vomiting. Pertinent negatives include no discharge, hematuria or possible pregnancy.       The following portions of the patient's history were reviewed and updated as appropriate: allergies, current medications, past family history, past medical history, past social history, past surgical history, and problem  list.    Medications:    Current Outpatient Medications:   •  albuterol sulfate  (90 Base) MCG/ACT inhaler, Inhale 2 puffs Every 4 (Four) Hours As Needed for Wheezing., Disp: 18 g, Rfl: 0  •  atenolol (TENORMIN) 50 MG tablet, Take 1 tablet by mouth Daily., Disp: 90 tablet, Rfl: 1  •  atorvastatin (LIPITOR) 10 MG tablet, Take 1 tablet by mouth Every Night., Disp: 30 tablet, Rfl: 5  •  Cholecalciferol (Vitamin D3) 50 MCG (2000 UT) tablet, Take 1 tablet by mouth Daily., Disp: 90 tablet, Rfl: 1  •  famotidine (PEPCID) 20 MG tablet, Take 1 tablet by mouth Every Morning., Disp: 90 tablet, Rfl: 1  •  hydrOXYzine (ATARAX) 50 MG tablet, Take 1 tablet by mouth Every 8 (Eight) Hours As Needed for Itching., Disp: 270 tablet, Rfl: 1  •  ibuprofen (ADVIL,MOTRIN) 800 MG tablet, Take 1 tablet by mouth Every 8 (Eight) Hours As Needed for Mild Pain., Disp: 90 tablet, Rfl: 1  •  Insulin Glargine (LANTUS SOLOSTAR) 100 UNIT/ML injection pen, Inject 15 Units under the skin into the appropriate area as directed Every Night., Disp: 100 mL, Rfl: 3  •  Insulin Lispro, 1 Unit Dial, (HUMALOG) 100 UNIT/ML solution pen-injector, Inject into skin 3 times daily with meals as directed per sliding scale provided. Do not exceed 40 units in 24 hours., Disp: 100 mL, Rfl: 3  •  lisinopril (PRINIVIL,ZESTRIL) 10 MG tablet, Take 1 tablet by mouth Daily., Disp: 90 tablet, Rfl: 1  •  nystatin (MYCOSTATIN) 592405 UNIT/GM cream, nystatin 100,000 unit/gram topical cream  APPLY TO THE AFFECTED AREA(S) BY TOPICAL ROUTE 2 TIMES PER DAY, Disp: , Rfl:   •  pantoprazole (PROTONIX) 40 MG EC tablet, Take 1 tablet by mouth Every Morning., Disp: 90 tablet, Rfl: 1  •  phenazopyridine (Pyridium) 100 MG tablet, Take 1 tablet by mouth 3 (Three) Times a Day As Needed for Bladder Spasms., Disp: 30 tablet, Rfl: 0  •  promethazine (PHENERGAN) 25 MG tablet, Take 1 tablet by mouth Every 6 (Six) Hours As Needed for Nausea or Vomiting., Disp: 30 tablet, Rfl: 0  •  sertraline  (Zoloft) 50 MG tablet, Take 1 tablet by mouth Daily., Disp: 90 tablet, Rfl: 1  •  tiZANidine (ZANAFLEX) 4 MG tablet, TAKE 1 TABLET BY MOUTH EVERY 8 (EIGHT) HOURS AS NEEDED FOR MUSCLE SPASMS., Disp: 90 tablet, Rfl: 0  •  traZODone (DESYREL) 150 MG tablet, Take 1 tablet by mouth At Night As Needed for Sleep., Disp: 90 tablet, Rfl: 1  •  glucose blood test strip, Use as instructed, Disp: 300 each, Rfl: 2    Subjective  Allergies   Allergen Reactions   • Ambien [Zolpidem] Other (See Comments)   • Ketorolac Hives and Shortness Of Breath   • Ketorolac Tromethamine Anaphylaxis   • Tetracycline Rash   • Tramadol Hives and Shortness Of Breath   • Sumatriptan Dizziness   • Sulfa Antibiotics Hives   • Penicillins Rash        Past Medical History:   Diagnosis Date   • Anxiety 89    I have PTSD. 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   • Urinary tract infection 11/15/22   • Visual impairment 3/4/99       Past Surgical History:   Procedure Laterality Date   • APPENDECTOMY  84   •  SECTION  4/10/84 2/3/86    Had 2 daughter's   • CHOLECYSTECTOMY  84   • COLONOSCOPY  20   • HERNIA REPAIR  78   • HYSTERECTOMY  96       Family History   Family history unknown: Yes        Social History     Socioeconomic History   • Marital status:    Tobacco Use   • Smoking status: Every Day     Packs/day: 1.00     Years: 15.00     Pack years: 15.00     Types: Cigarettes, Electronic Cigarette   Substance and Sexual Activity   • Alcohol use: Never   • Drug use: Not Currently     Types: Barbiturates, Benzodiazepines, Heroin, Marijuana   • Sexual activity: Not Currently     Partners: Male     Birth  control/protection: None, Hysterectomy       Review of Systems   Constitutional: Positive for chills. Negative for activity change, fatigue, fever and unexpected weight change.   HENT: Negative for congestion, ear pain, postnasal drip, sinus pressure and sore throat.    Eyes: Negative for pain, discharge and redness.   Respiratory: Negative for cough, shortness of breath and wheezing.    Cardiovascular: Negative for chest pain, palpitations and leg swelling.   Gastrointestinal: Positive for nausea and vomiting. Negative for diarrhea.   Endocrine: Negative for cold intolerance and heat intolerance.   Genitourinary: Positive for dysuria, flank pain, frequency, hesitancy and urgency. Negative for decreased urine volume and hematuria.   Musculoskeletal: Negative for arthralgias and myalgias.   Skin: Negative for rash and wound.   Neurological: Negative for dizziness, light-headedness and headaches.   Hematological: Does not bruise/bleed easily.   Psychiatric/Behavioral: Positive for sleep disturbance. Negative for confusion and dysphoric mood. The patient is not nervous/anxious.          Objective  Vitals:    01/24/23 1143   BP: 130/70   BP Location: Left arm   Patient Position: Sitting   Cuff Size: Adult   Pulse: 78   Resp: 16   Temp: 97.1 °F (36.2 °C)   TempSrc: Temporal   SpO2: 98%   Weight: 76.7 kg (169 lb)     Body mass index is 27.95 kg/m².     Physical Exam  Physical Exam  Vitals and nursing note reviewed.   Constitutional:       General: She is not in acute distress.     Appearance: She is not ill-appearing.   HENT:      Head: Normocephalic.      Right Ear: Tympanic membrane, ear canal and external ear normal. There is no impacted cerumen.      Left Ear: Tympanic membrane, ear canal and external ear normal. There is no impacted cerumen.      Nose: No congestion or rhinorrhea.      Mouth/Throat:      Mouth: Mucous membranes are moist.      Pharynx: Oropharynx is clear. No oropharyngeal exudate or posterior  oropharyngeal erythema.   Eyes:      General:         Right eye: No discharge.         Left eye: No discharge.      Extraocular Movements: Extraocular movements intact.      Conjunctiva/sclera: Conjunctivae normal.      Pupils: Pupils are equal, round, and reactive to light.   Cardiovascular:      Rate and Rhythm: Normal rate and regular rhythm.      Heart sounds: Normal heart sounds. No murmur heard.    No friction rub. No gallop.   Pulmonary:      Effort: Pulmonary effort is normal. No respiratory distress.      Breath sounds: Normal breath sounds. No wheezing.   Abdominal:      General: Bowel sounds are normal. There is no distension.      Palpations: Abdomen is soft. There is no mass.      Tenderness: There is no abdominal tenderness.   Musculoskeletal:         General: No swelling. Normal range of motion.      Cervical back: Normal range of motion. No tenderness.      Right lower leg: No edema.      Left lower leg: No edema.   Lymphadenopathy:      Cervical: No cervical adenopathy.   Skin:     Findings: No bruising, erythema or rash.   Neurological:      Mental Status: She is oriented to person, place, and time.      Gait: Gait normal.   Psychiatric:         Mood and Affect: Mood normal.         Behavior: Behavior normal.         Thought Content: Thought content normal.         Judgment: Judgment normal.         Diagnostic Data  Procedures    Assessment  Diagnoses and all orders for this visit:    1. Urinary tract infection without hematuria, site unspecified (Primary)  -     Urine Culture - Urine, Urine, Clean Catch    2. Primary hypertension    3. Type 2 diabetes mellitus without complication, with long-term current use of insulin ([facility])    4. Gastroesophageal reflux disease, unspecified whether esophagitis present    5. Insomnia, unspecified type    Other orders  -     promethazine (PHENERGAN) 25 MG tablet; Take 1 tablet by mouth Every 6 (Six) Hours As Needed for Nausea or Vomiting.  Dispense: 30 tablet;  Refill: 0  -     glucose blood test strip; Use as instructed  Dispense: 300 each; Refill: 2  -     phenazopyridine (Pyridium) 100 MG tablet; Take 1 tablet by mouth 3 (Three) Times a Day As Needed for Bladder Spasms.  Dispense: 30 tablet; Refill: 0  -     traZODone (DESYREL) 150 MG tablet; Take 1 tablet by mouth At Night As Needed for Sleep.  Dispense: 90 tablet; Refill: 1        Plan    1. Urinary tract infection without hematuria, site unspecified (Primary)- UA in office revealed leuks.  Treat preemptively with Macrobid.  Obtain culture and sensitivity.    2. Primary hypertension- well controlled on current meds.  Keep same.  Continue to monitor.    3. Type 2 diabetes mellitus without complication, with long-term current use of insulin (HCC)- well controlled on current meds.  Keep same.  Continue to monitor.    4. Gastroesophageal reflux disease, unspecified whether esophagitis present- well controlled on current meds.  Keep same.  Continue to monitor.    5. Insomnia, unspecified type- worse, increase dose of trazodone to 150 mg nightly.      Return in about 6 weeks (around 3/7/2023) for Recheck.    Chris Hogan PA-C  01/24/2023  Answers for HPI/ROS submitted by the patient on 1/23/2023  What is the primary reason for your visit?: Painful Urination

## 2023-01-26 ENCOUNTER — TELEPHONE (OUTPATIENT)
Dept: INTERNAL MEDICINE | Facility: CLINIC | Age: 58
End: 2023-01-26
Payer: COMMERCIAL

## 2023-01-26 LAB — BACTERIA SPEC AEROBE CULT: NORMAL

## 2023-02-06 ENCOUNTER — TELEPHONE (OUTPATIENT)
Dept: INTERNAL MEDICINE | Facility: CLINIC | Age: 58
End: 2023-02-06

## 2023-02-06 RX ORDER — PROMETHAZINE HYDROCHLORIDE 25 MG/1
25 TABLET ORAL EVERY 6 HOURS PRN
Qty: 30 TABLET | Refills: 0 | Status: SHIPPED | OUTPATIENT
Start: 2023-02-06 | End: 2023-02-28

## 2023-02-06 RX ORDER — ERGOCALCIFEROL 1.25 MG/1
50000 CAPSULE ORAL WEEKLY
Qty: 4 CAPSULE | Refills: 2 | Status: SHIPPED | OUTPATIENT
Start: 2023-02-06

## 2023-02-06 NOTE — TELEPHONE ENCOUNTER
Caller: Marta Polanco    Relationship: Self    Best call back number: [phone]    What was the call regarding: PATIENT IS REQUESTING VITAMIN D TO HELP AID IN THE HEALING OF HER BROKEN FOOT.     Do you require a callback: YES

## 2023-02-10 ENCOUNTER — TELEPHONE (OUTPATIENT)
Dept: INTERNAL MEDICINE | Facility: CLINIC | Age: 58
End: 2023-02-10
Payer: COMMERCIAL

## 2023-02-10 NOTE — TELEPHONE ENCOUNTER
Caller: Marta Polanco    Relationship: Self    Best call back number: [phone]    What medication are you requesting: GABAPENTIN    What are your current symptoms: BROKEN FOOT- PAIN AND JOSE HORSES- OSTEOPETROSIS     How long have you been experiencing symptoms: OVER A WEEK AND A HALF    Have you had these symptoms before:    [x] Yes  [] No    Have you been treated for these symptoms before:   [x] Yes  [] No    If a prescription is needed, what is your preferred pharmacy and phone number:    [facility] [place], [place] - [address] [place] [place] [address] - [phone]  - [phone] FX    Additional notes: PATIENT IS IN A BOOT AND IS IN SEVERE PAIN STILL. THE MEDICATION PRESCRIBED BY HER OTHER DOCTOR IS NOT HELPING. HE RECOMMENDED SHE CALL HER PCP TO GET A PRESCRIPTION FOR A HIGH DOSE OF GABAPENTIN. HE DOES NOT LIKE PRESCRIBING MEDICATION AND TOLD HER THAT THE PCP SHOULD BE THE ONE PRESCRIBING IT.  SHE IS CURRENTLY  MG 3 TIMES A DAY OF THIS. PLEASE CALL IF THIS CANNOT BE DONE.

## 2023-02-13 RX ORDER — TIZANIDINE 4 MG/1
4 TABLET ORAL EVERY 8 HOURS PRN
Qty: 90 TABLET | Refills: 0 | Status: SHIPPED | OUTPATIENT
Start: 2023-02-13 | End: 2023-03-27

## 2023-02-28 RX ORDER — PROMETHAZINE HYDROCHLORIDE 25 MG/1
25 TABLET ORAL EVERY 6 HOURS PRN
Qty: 30 TABLET | Refills: 0 | Status: SHIPPED | OUTPATIENT
Start: 2023-02-28 | End: 2023-03-27

## 2023-03-07 RX ORDER — IBUPROFEN 800 MG/1
800 TABLET ORAL EVERY 8 HOURS PRN
Qty: 90 TABLET | Refills: 0 | Status: SHIPPED | OUTPATIENT
Start: 2023-03-07

## 2023-03-27 RX ORDER — TIZANIDINE 4 MG/1
4 TABLET ORAL EVERY 8 HOURS PRN
Qty: 90 TABLET | Refills: 0 | Status: SHIPPED | OUTPATIENT
Start: 2023-03-27

## 2023-03-27 RX ORDER — PROMETHAZINE HYDROCHLORIDE 25 MG/1
25 TABLET ORAL EVERY 6 HOURS PRN
Qty: 30 TABLET | Refills: 0 | Status: SHIPPED | OUTPATIENT
Start: 2023-03-27

## 2023-04-10 RX ORDER — PROMETHAZINE HYDROCHLORIDE 25 MG/1
25 TABLET ORAL EVERY 6 HOURS PRN
Qty: 30 TABLET | Refills: 0 | OUTPATIENT
Start: 2023-04-10

## 2023-04-20 RX ORDER — TRAZODONE HYDROCHLORIDE 150 MG/1
150 TABLET ORAL NIGHTLY PRN
Qty: 90 TABLET | Refills: 1 | Status: SHIPPED | OUTPATIENT
Start: 2023-04-20

## 2023-04-20 RX ORDER — PROMETHAZINE HYDROCHLORIDE 25 MG/1
25 TABLET ORAL EVERY 6 HOURS PRN
Qty: 30 TABLET | Refills: 0 | Status: SHIPPED | OUTPATIENT
Start: 2023-04-20

## 2023-04-20 NOTE — TELEPHONE ENCOUNTER
Caller: Marta Polanco    Relationship: Self    Best call back number: [phone]    Requested Prescriptions:   Requested Prescriptions     Pending Prescriptions Disp Refills   • promethazine (PHENERGAN) 25 MG tablet 30 tablet 0     Sig: Take 1 tablet by mouth Every 6 (Six) Hours As Needed for Nausea or Vomiting.   • traZODone (DESYREL) 150 MG tablet 90 tablet 1     Sig: Take 1 tablet by mouth At Night As Needed for Sleep.        Pharmacy where request should be sent: [address] - [phone]  - [phone] FX     Last office visit with prescribing clinician: 1/24/2023   Last telemedicine visit with prescribing clinician: Visit date not found   Next office visit with prescribing clinician: Visit date not found     Additional details provided by patient: PATIENT WOULD LIKE TO GET AN INCREASE ON THE STRENGTH OF HER TRAZADONE IF IT IS POSSIBLE. SHE HAS AN APPOINTMENT FOR UPPER/LOWER GI ON 5/1/2023.    Does the patient have less than a 3 day supply:  [x] Yes  [] No    Would you like a call back once the refill request has been completed: [] Yes [x] No    If the office needs to give you a call back, can they leave a voicemail: [] Yes [x] No    Alina Cardona Rep   04/20/23 10:38 EDT

## 2023-05-10 RX ORDER — PROMETHAZINE HYDROCHLORIDE 25 MG/1
25 TABLET ORAL EVERY 6 HOURS PRN
Qty: 30 TABLET | Refills: 0 | Status: SHIPPED | OUTPATIENT
Start: 2023-05-10

## 2023-05-24 ENCOUNTER — TELEPHONE (OUTPATIENT)
Dept: INTERNAL MEDICINE | Facility: CLINIC | Age: 58
End: 2023-05-24

## 2023-05-24 RX ORDER — PROMETHAZINE HYDROCHLORIDE 25 MG/1
25 SUPPOSITORY RECTAL EVERY 6 HOURS PRN
Qty: 30 SUPPOSITORY | Refills: 0 | Status: SHIPPED | OUTPATIENT
Start: 2023-05-24

## 2023-05-24 NOTE — TELEPHONE ENCOUNTER
Caller: Marta Polanco    Relationship: Self    Best call back number: [phone]    What medication are you requesting: SUPPOSITORY FOR NAUSEA AND VOMITING     What are your current symptoms: NAUSEA AND VOMITING     How long have you been experiencing symptoms: 3 DAYS     Have you had these symptoms before:    [x] Yes  [] No    Have you been treated for these symptoms before:   [x] Yes  [] No    If a prescription is needed, what is your preferred pharmacy and phone number: [facility] [place], [place] - [address] [place] [address] - [phone]  - [phone] FX     Additional notes: PATIENT STATES SHE WAS GIVEN SOME MEDICATION FOR NAUSEA AND VOMITING BUT SHE CANNOT KEEP THAT DOWN AND SHE CANNOT KEEP ANY PILL DOWN.

## 2023-05-30 RX ORDER — TIZANIDINE 4 MG/1
4 TABLET ORAL EVERY 8 HOURS PRN
Qty: 90 TABLET | Refills: 0 | Status: SHIPPED | OUTPATIENT
Start: 2023-05-30

## 2023-06-13 RX ORDER — PROMETHAZINE HYDROCHLORIDE 25 MG/1
25 SUPPOSITORY RECTAL EVERY 6 HOURS PRN
Qty: 30 SUPPOSITORY | Refills: 0 | Status: ON HOLD | OUTPATIENT
Start: 2023-06-13

## 2023-06-13 RX ORDER — PANTOPRAZOLE SODIUM 40 MG/1
40 TABLET, DELAYED RELEASE ORAL EVERY MORNING
Qty: 90 TABLET | Refills: 0 | Status: ON HOLD | OUTPATIENT
Start: 2023-06-13

## 2023-06-13 RX ORDER — ATENOLOL 50 MG/1
50 TABLET ORAL DAILY
Qty: 90 TABLET | Refills: 0 | Status: ON HOLD | OUTPATIENT
Start: 2023-06-13

## 2023-06-13 RX ORDER — ACETAMINOPHEN 160 MG
TABLET,DISINTEGRATING ORAL
Qty: 90 CAPSULE | Refills: 0 | Status: ON HOLD | OUTPATIENT
Start: 2023-06-13

## 2023-06-15 PROBLEM — A41.9 SEPSIS: Status: ACTIVE | Noted: 2023-06-15

## 2023-06-15 PROBLEM — I95.9 HYPOTENSION: Status: ACTIVE | Noted: 2023-06-15

## 2023-06-15 PROBLEM — S72.91XA RIGHT FEMORAL FRACTURE: Status: ACTIVE | Noted: 2023-06-15

## 2023-06-15 PROBLEM — S72.91XA RIGHT FEMORAL FRACTURE: Status: RESOLVED | Noted: 2023-06-15 | Resolved: 2023-06-15

## 2023-06-15 PROBLEM — G62.9 PERIPHERAL NEUROPATHY: Status: ACTIVE | Noted: 2023-06-15

## 2023-06-15 PROBLEM — E83.42 HYPOMAGNESEMIA: Status: ACTIVE | Noted: 2023-06-15

## 2023-06-15 PROBLEM — Z72.0 TOBACCO USE: Status: ACTIVE | Noted: 2023-06-15

## 2023-06-15 PROBLEM — R55 SYNCOPE: Status: ACTIVE | Noted: 2023-06-15

## 2023-06-15 PROBLEM — F19.11 HISTORY OF SUBSTANCE ABUSE: Status: ACTIVE | Noted: 2023-06-15

## 2023-07-24 ENCOUNTER — HOSPITAL ENCOUNTER (EMERGENCY)
Facility: HOSPITAL | Age: 58
Discharge: HOME | End: 2023-07-24
Attending: EMERGENCY MEDICINE | Admitting: EMERGENCY MEDICINE
Payer: COMMERCIAL

## 2023-07-24 VITALS
HEIGHT: 65 IN | BODY MASS INDEX: 26.66 KG/M2 | WEIGHT: 160 LBS | SYSTOLIC BLOOD PRESSURE: 121 MMHG | RESPIRATION RATE: 18 BRPM | HEART RATE: 76 BPM | OXYGEN SATURATION: 96 % | TEMPERATURE: 99.3 F | DIASTOLIC BLOOD PRESSURE: 70 MMHG

## 2023-07-24 DIAGNOSIS — N39.0 ACUTE UTI: Primary | ICD-10-CM

## 2023-07-24 LAB
BACTERIA UR QL AUTO: ABNORMAL /HPF
BILIRUB UR QL STRIP: NEGATIVE
CLARITY UR: ABNORMAL
COLOR UR: YELLOW
GLUCOSE UR STRIP-MCNC: ABNORMAL MG/DL
HGB UR QL STRIP.AUTO: ABNORMAL
HYALINE CASTS UR QL AUTO: ABNORMAL /LPF
KETONES UR QL STRIP: ABNORMAL
LEUKOCYTE ESTERASE UR QL STRIP.AUTO: ABNORMAL
NITRITE UR QL STRIP: POSITIVE
PH UR STRIP.AUTO: 5.5 [PH] (ref 5–8)
PROT UR QL STRIP: ABNORMAL
RBC # UR STRIP: ABNORMAL /HPF
REF LAB TEST METHOD: ABNORMAL
SP GR UR STRIP: 1.03 (ref 1–1.03)
SQUAMOUS #/AREA URNS HPF: ABNORMAL /HPF
UROBILINOGEN UR QL STRIP: ABNORMAL
WBC # UR STRIP: ABNORMAL /HPF

## 2023-07-24 PROCEDURE — 99284 EMERGENCY DEPT VISIT MOD MDM: CPT

## 2023-07-24 PROCEDURE — 87077 CULTURE AEROBIC IDENTIFY: CPT | Performed by: EMERGENCY MEDICINE

## 2023-07-24 PROCEDURE — 81001 URINALYSIS AUTO W/SCOPE: CPT | Performed by: EMERGENCY MEDICINE

## 2023-07-24 PROCEDURE — 87086 URINE CULTURE/COLONY COUNT: CPT | Performed by: EMERGENCY MEDICINE

## 2023-07-24 PROCEDURE — 87186 SC STD MICRODIL/AGAR DIL: CPT | Performed by: EMERGENCY MEDICINE

## 2023-07-24 RX ORDER — ATORVASTATIN CALCIUM 10 MG/1
10 TABLET, FILM COATED ORAL NIGHTLY
Qty: 30 TABLET | Refills: 4 | Status: SHIPPED | OUTPATIENT
Start: 2023-07-24

## 2023-07-24 RX ORDER — PROMETHAZINE HYDROCHLORIDE 25 MG/1
25 TABLET ORAL EVERY 8 HOURS PRN
Qty: 12 TABLET | Refills: 0 | Status: SHIPPED | OUTPATIENT
Start: 2023-07-24 | End: 2023-07-24 | Stop reason: SDUPTHER

## 2023-07-24 RX ORDER — PROMETHAZINE HYDROCHLORIDE 25 MG/1
25 TABLET ORAL EVERY 8 HOURS PRN
Qty: 12 TABLET | Refills: 0 | Status: SHIPPED | OUTPATIENT
Start: 2023-07-24

## 2023-07-24 RX ORDER — PHENAZOPYRIDINE HYDROCHLORIDE 100 MG/1
100 TABLET, FILM COATED ORAL 3 TIMES DAILY PRN
Qty: 10 TABLET | Refills: 0 | Status: SHIPPED | OUTPATIENT
Start: 2023-07-24 | End: 2023-07-24 | Stop reason: SDUPTHER

## 2023-07-24 RX ORDER — CEFDINIR 300 MG/1
300 CAPSULE ORAL 2 TIMES DAILY
Qty: 13 CAPSULE | Refills: 0 | Status: SHIPPED | OUTPATIENT
Start: 2023-07-24

## 2023-07-24 RX ORDER — CEFDINIR 300 MG/1
300 CAPSULE ORAL 2 TIMES DAILY
Qty: 13 CAPSULE | Refills: 0 | Status: SHIPPED | OUTPATIENT
Start: 2023-07-24 | End: 2023-07-24 | Stop reason: SDUPTHER

## 2023-07-24 RX ORDER — CEFDINIR 300 MG/1
300 CAPSULE ORAL ONCE
Status: COMPLETED | OUTPATIENT
Start: 2023-07-24 | End: 2023-07-24

## 2023-07-24 RX ORDER — PHENAZOPYRIDINE HYDROCHLORIDE 100 MG/1
100 TABLET, FILM COATED ORAL 3 TIMES DAILY PRN
Qty: 10 TABLET | Refills: 0 | Status: SHIPPED | OUTPATIENT
Start: 2023-07-24

## 2023-07-24 RX ADMIN — CEFDINIR 300 MG: 300 CAPSULE ORAL at 16:52

## 2023-07-24 NOTE — ED PROVIDER NOTES
"Subjective   History of Present Illness  58-year-old female presents for evaluation of \"UTI.\"  The patient tells me that she was recently admitted to our facility back in  for UTI.  She tells me that she had been doing well until 3 days ago when she began experiencing recurrent dysuria and suprapubic abdominal pain.  She denies any accompanying fevers, chills, or other systemic symptoms.  She denies any back or flank pain.  She denies any rash.  Aside from her dysuria, she has no other complaints at this time.    Review of Systems   Genitourinary:  Positive for dysuria.   All other systems reviewed and are negative.    Past Medical History:   Diagnosis Date    Anxiety 89    I have PTSD. Started after my sister was murder    Colon polyp 22    Depression 89    Diabetes mellitus 22    GERD (gastroesophageal reflux disease)     Headache     Hypertension     Low back pain     Neuromuscular disorder     Pancreatitis     In the hospital twice for this    Substance abuse 89    Im clean and sober now    Urinary tract infection 11/15/22    Visual impairment 3/4/99       Allergies   Allergen Reactions    Ambien [Zolpidem] Other (See Comments)    Ketorolac Hives and Shortness Of Breath    Ketorolac Tromethamine Anaphylaxis    Tetracycline Rash    Tramadol Hives and Shortness Of Breath    Sumatriptan Dizziness    Sulfa Antibiotics Hives    Morphine Rash    Ondansetron Headache    Penicillins Rash       Past Surgical History:   Procedure Laterality Date    APPENDECTOMY  84     SECTION  4/10/84 2/3/86    Had 2 daughter's    CHOLECYSTECTOMY  84    COLONOSCOPY  20    HERNIA REPAIR  80    HYSTERECTOMY  96       Family History   Problem Relation Age of Onset    Hypertension Father        Social History     Socioeconomic History    Marital status:    Tobacco Use    Smoking status: Every Day     Packs/day: 1.00     Years: 15.00     Pack years: 15.00     Types: Cigarettes, " "Electronic Cigarette   Substance and Sexual Activity    Alcohol use: Never    Drug use: Not Currently     Types: Barbiturates, Benzodiazepines, Heroin, Marijuana    Sexual activity: Not Currently     Partners: Male     Birth control/protection: None, Hysterectomy           Objective   Physical Exam  Vitals and nursing note reviewed.   Constitutional:       General: She is not in acute distress.     Appearance: Normal appearance. She is well-developed. She is not diaphoretic.      Comments: Nontoxic-appearing female   HENT:      Head: Normocephalic and atraumatic.   Eyes:      Pupils: Pupils are equal, round, and reactive to light.   Cardiovascular:      Rate and Rhythm: Normal rate and regular rhythm.      Heart sounds: Normal heart sounds. No murmur heard.    No friction rub. No gallop.   Pulmonary:      Effort: Pulmonary effort is normal. No respiratory distress.      Breath sounds: Normal breath sounds. No wheezing or rales.   Abdominal:      General: Bowel sounds are normal. There is no distension.      Palpations: Abdomen is soft. There is no mass.      Tenderness: There is no abdominal tenderness. There is no guarding or rebound.      Comments: Mild suprapubic abdominal tenderness present, no guarding, no pain out of proportion to exam   Genitourinary:     Comments: No CVA tenderness present  Musculoskeletal:         General: Normal range of motion.   Skin:     General: Skin is warm and dry.      Findings: No erythema or rash.      Comments: No dermatomal rash noted   Neurological:      Mental Status: She is alert and oriented to person, place, and time.   Psychiatric:         Mood and Affect: Mood normal.         Thought Content: Thought content normal.         Judgment: Judgment normal.       Procedures           ED Course  ED Course as of 07/24/23 1931 Mon Jul 24, 2023   1703 58-year-old female presents for evaluation of \"UTI.\"  The patient tells me that she was recently admitted to our facility back in " June for UTI.  She had been doing well until 3 days ago when she began experiencing recurrent dysuria and suprapubic abdominal pain.  She denies any fevers, chills, or systemic symptoms.  No back pain or flank pain. [DD]   1703 On arrival to the ED, the patient is nontoxic-appearing.  Vital signs are reassuring.  Nonsurgical abdomen.  No CVA tenderness noted.  No dermatomal rash present.  Urinalysis is suggestive of infection.  Urine culture obtained.  I looked at the patient's urine culture results from her previous UTI, and her UTI is susceptible to cephalosporins.  First dose of cefdinir given here in the emergency department.  Prescription for cefdinir.  I feel that the patient can be managed on outpatient basis.  She will follow-up with her primary care physician within the next week.  Agreeable with plan and given appropriate strict return precautions.  Additionally, the patient was given prescriptions for Phenergan and Pyridium per her request as well. [DD]      ED Course User Index  [DD] Savage Ferrera MD                                      Recent Results (from the past 24 hour(s))   Urinalysis With Culture If Indicated - Urine, Clean Catch    Collection Time: 07/24/23  3:57 PM    Specimen: Urine, Clean Catch   Result Value Ref Range    Color, UA Yellow Yellow, Straw    Appearance, UA Cloudy (A) Clear    pH, UA 5.5 5.0 - 8.0    Specific Gravity, UA 1.032 (H) 1.001 - 1.030    Glucose, UA >=1000 mg/dL (3+) (A) Negative    Ketones, UA Trace (A) Negative    Bilirubin, UA Negative Negative    Blood, UA Moderate (2+) (A) Negative    Protein,  mg/dL (2+) (A) Negative    Leuk Esterase, UA Trace (A) Negative    Nitrite, UA Positive (A) Negative    Urobilinogen, UA 0.2 E.U./dL 0.2 - 1.0 E.U./dL   Urinalysis, Microscopic Only - Urine, Clean Catch    Collection Time: 07/24/23  3:57 PM    Specimen: Urine, Clean Catch   Result Value Ref Range    RBC, UA 31-50 (A) None Seen, 0-2 /HPF    WBC, UA 21-30 (A) None  "Seen, 0-2 /HPF    Bacteria, UA 4+ (A) None Seen, Trace /HPF    Squamous Epithelial Cells, UA 3-6 (A) None Seen, 0-2 /HPF    Hyaline Casts, UA 7-12 0 - 6 /LPF    Methodology Automated Microscopy      Note: In addition to lab results from this visit, the labs listed above may include labs taken at another facility or during a different encounter within the last 24 hours. Please correlate lab times with ED admission and discharge times for further clarification of the services performed during this visit.    No orders to display     Vitals:    07/24/23 1507   BP: 121/70   BP Location: Right arm   Patient Position: Sitting   Pulse: 76   Resp: 18   Temp: 99.3 °F (37.4 °C)   TempSrc: Oral   SpO2: 96%   Weight: 72.6 kg (160 lb)   Height: 165.1 cm (65\")     Medications   cefdinir (OMNICEF) capsule 300 mg (300 mg Oral Given 7/24/23 1652)     ECG/EMG Results (last 24 hours)       ** No results found for the last 24 hours. **          No orders to display              Medical Decision Making  Problems Addressed:  Acute UTI: complicated acute illness or injury    Risk  Prescription drug management.        Final diagnoses:   Acute UTI       ED Disposition  ED Disposition       ED Disposition   Discharge    Condition   Stable    Comment   --               Chris Hogan PA-C  [address] [address]  [phone]    In 1 week           Medication List        New Prescriptions      cefdinir 300 MG capsule  Commonly known as: OMNICEF  Take 1 capsule by mouth 2 (Two) Times a Day.     phenazopyridine 100 MG tablet  Commonly known as: PYRIDIUM  Take 1 tablet by mouth 3 (Three) Times a Day As Needed for Bladder Spasms.            Changed      * Promethegan 25 MG suppository  Generic drug: promethazine  INSERT 1 SUPPOSITORY INTO THE RECTUM EVERY 6 (SIX) HOURS AS NEEDED FOR NAUSEA OR VOMITING.  What changed: Another medication with the same name was added. Make sure you understand how and when to take " each.     * promethazine 25 MG tablet  Commonly known as: PHENERGAN  Take 1 tablet by mouth Every 8 (Eight) Hours As Needed for Nausea or Vomiting.  What changed: You were already taking a medication with the same name, and this prescription was added. Make sure you understand how and when to take each.           * This list has 2 medication(s) that are the same as other medications prescribed for you. Read the directions carefully, and ask your doctor or other care provider to review them with you.                   Where to Get Your Medications        These medications were sent to [facility] [place] [address]      Hours: 7:00 AM-5:30 PM M-F, 8:00 AM-4:30 PM Sat-Sun Phone: [phone]   cefdinir 300 MG capsule  phenazopyridine 100 MG tablet  promethazine 25 MG tablet            Savage Ferrera MD  07/24/23 9610

## 2023-07-26 LAB — BACTERIA SPEC AEROBE CULT: ABNORMAL

## 2023-08-11 RX ORDER — TIZANIDINE 4 MG/1
4 TABLET ORAL EVERY 8 HOURS PRN
Qty: 90 TABLET | Refills: 0 | Status: SHIPPED | OUTPATIENT
Start: 2023-08-11

## 2023-08-14 RX ORDER — PROMETHAZINE HYDROCHLORIDE 25 MG/1
SUPPOSITORY RECTAL
Qty: 30 SUPPOSITORY | Refills: 0 | Status: SHIPPED | OUTPATIENT
Start: 2023-08-14

## 2023-08-30 RX ORDER — PROMETHAZINE HYDROCHLORIDE 25 MG/1
SUPPOSITORY RECTAL
Qty: 30 SUPPOSITORY | Refills: 0 | Status: SHIPPED | OUTPATIENT
Start: 2023-08-30

## 2023-09-13 RX ORDER — PANTOPRAZOLE SODIUM 40 MG/1
TABLET, DELAYED RELEASE ORAL
Qty: 90 TABLET | Refills: 0 | OUTPATIENT
Start: 2023-09-13

## 2023-09-19 RX ORDER — ATENOLOL 50 MG/1
TABLET ORAL
Qty: 90 TABLET | Refills: 0 | Status: SHIPPED | OUTPATIENT
Start: 2023-09-19

## 2023-09-19 RX ORDER — OMEPRAZOLE 40 MG/1
40 CAPSULE, DELAYED RELEASE ORAL DAILY
Qty: 90 CAPSULE | Refills: 1 | Status: SHIPPED | OUTPATIENT
Start: 2023-09-19

## 2023-09-19 RX ORDER — SERTRALINE HYDROCHLORIDE 100 MG/1
100 TABLET, FILM COATED ORAL DAILY
Qty: 90 TABLET | Refills: 3 | Status: SHIPPED | OUTPATIENT
Start: 2023-09-19

## 2023-09-19 RX ORDER — TIZANIDINE 4 MG/1
4 TABLET ORAL EVERY 8 HOURS PRN
Qty: 90 TABLET | Refills: 0 | Status: SHIPPED | OUTPATIENT
Start: 2023-09-19

## 2023-09-19 NOTE — TELEPHONE ENCOUNTER
Caller: Marta Polanco    Relationship: Self    Best call back number: [phone]     Requested Prescriptions:   Requested Prescriptions     Pending Prescriptions Disp Refills    omeprazole (priLOSEC) 40 MG capsule       Sig: Take 1 capsule by mouth Daily.    sertraline (ZOLOFT) 100 MG tablet 90 tablet 3     Sig: Take 1 tablet by mouth Daily.        Pharmacy where request should be sent: [address] - [phone]  - [phone] FX     Last office visit with prescribing clinician: 1/24/2023   Last telemedicine visit with prescribing clinician: Visit date not found   Next office visit with prescribing clinician: 10/3/2023     Additional details provided by patient:     Does the patient have less than a 3 day supply:  [x] Yes  [] No    Would you like a call back once the refill request has been completed: [] Yes [x] No    If the office needs to give you a call back, can they leave a voicemail: [] Yes [x] No    Alina Fernandes Rep   09/19/23 14:51 EDT

## 2023-09-21 RX ORDER — CLOTRIMAZOLE 1 %
CREAM (GRAM) TOPICAL 2 TIMES DAILY
Qty: 85 G | Refills: 1 | Status: SHIPPED | OUTPATIENT
Start: 2023-09-21

## 2023-09-21 NOTE — TELEPHONE ENCOUNTER
Caller: Marta Polanco    Relationship: Self    Best call back number: [phone]     Requested Prescriptions:   Requested Prescriptions     Pending Prescriptions Disp Refills    clotrimazole (LOTRIMIN) 1 % cream          Pharmacy where request should be sent: [facility] [place], [place] - [address] - [phone]  - [phone] FX     Last office visit with prescribing clinician: 1/24/2023   Last telemedicine visit with prescribing clinician: Visit date not found   Next office visit with prescribing clinician: 10/3/2023     Additional details provided by patient: PATIENT IS OUT    Does the patient have less than a 3 day supply:  [x] Yes  [] No    Would you like a call back once the refill request has been completed: [] Yes [x] No    If the office needs to give you a call back, can they leave a voicemail: [] Yes [x] No    Alina Moore Rep   09/21/23 13:23 EDT

## 2023-09-26 RX ORDER — PROMETHAZINE HYDROCHLORIDE 25 MG/1
SUPPOSITORY RECTAL
Qty: 30 SUPPOSITORY | Refills: 0 | Status: SHIPPED | OUTPATIENT
Start: 2023-09-26

## 2023-10-16 RX ORDER — PROMETHAZINE HYDROCHLORIDE 25 MG/1
SUPPOSITORY RECTAL
Qty: 30 SUPPOSITORY | Refills: 0 | Status: SHIPPED | OUTPATIENT
Start: 2023-10-16

## 2023-10-16 RX ORDER — TIZANIDINE 4 MG/1
4 TABLET ORAL EVERY 8 HOURS PRN
Qty: 90 TABLET | Refills: 0 | Status: SHIPPED | OUTPATIENT
Start: 2023-10-16

## 2023-10-16 RX ORDER — HYDROXYZINE 50 MG/1
50 TABLET, FILM COATED ORAL EVERY 8 HOURS PRN
Qty: 270 TABLET | Refills: 0 | Status: SHIPPED | OUTPATIENT
Start: 2023-10-16

## 2023-10-18 RX ORDER — LISINOPRIL 10 MG/1
10 TABLET ORAL DAILY
Qty: 90 TABLET | Refills: 0 | Status: SHIPPED | OUTPATIENT
Start: 2023-10-18

## 2023-10-24 ENCOUNTER — TELEPHONE (OUTPATIENT)
Dept: INTERNAL MEDICINE | Facility: CLINIC | Age: 58
End: 2023-10-24

## 2023-10-24 NOTE — TELEPHONE ENCOUNTER
Hub to read    Attempted to reach patient to see if she has had her mammogram this year. If patient has not had one and would like one she can call central scheduling at [phone] to get scheduled. If she does not want please let us know so that we can document that she does not want. Thank you.

## 2023-11-03 RX ORDER — PROMETHAZINE HYDROCHLORIDE 25 MG/1
SUPPOSITORY RECTAL
Qty: 30 SUPPOSITORY | Refills: 0 | Status: SHIPPED | OUTPATIENT
Start: 2023-11-03

## 2023-11-06 ENCOUNTER — OFFICE VISIT (OUTPATIENT)
Dept: INTERNAL MEDICINE | Facility: CLINIC | Age: 58
End: 2023-11-06
Payer: COMMERCIAL

## 2023-11-06 VITALS
BODY MASS INDEX: 29.96 KG/M2 | HEART RATE: 92 BPM | TEMPERATURE: 97.3 F | DIASTOLIC BLOOD PRESSURE: 60 MMHG | WEIGHT: 179.8 LBS | SYSTOLIC BLOOD PRESSURE: 98 MMHG | OXYGEN SATURATION: 96 % | HEIGHT: 65 IN

## 2023-11-06 DIAGNOSIS — Z12.11 SCREEN FOR COLON CANCER: ICD-10-CM

## 2023-11-06 DIAGNOSIS — E11.9 TYPE 2 DIABETES MELLITUS WITHOUT COMPLICATION, WITH LONG-TERM CURRENT USE OF INSULIN: ICD-10-CM

## 2023-11-06 DIAGNOSIS — F32.A DEPRESSION, UNSPECIFIED DEPRESSION TYPE: ICD-10-CM

## 2023-11-06 DIAGNOSIS — Z79.4 TYPE 2 DIABETES MELLITUS WITHOUT COMPLICATION, WITH LONG-TERM CURRENT USE OF INSULIN: ICD-10-CM

## 2023-11-06 DIAGNOSIS — E11.9 ENCOUNTER FOR DIABETIC FOOT EXAM: ICD-10-CM

## 2023-11-06 DIAGNOSIS — N39.0 URINARY TRACT INFECTION WITHOUT HEMATURIA, SITE UNSPECIFIED: ICD-10-CM

## 2023-11-06 DIAGNOSIS — Z00.00 ANNUAL PHYSICAL EXAM: Primary | ICD-10-CM

## 2023-11-06 DIAGNOSIS — F19.11 HISTORY OF DRUG ABUSE IN REMISSION: ICD-10-CM

## 2023-11-06 DIAGNOSIS — Z12.31 ENCOUNTER FOR SCREENING MAMMOGRAM FOR MALIGNANT NEOPLASM OF BREAST: ICD-10-CM

## 2023-11-06 DIAGNOSIS — K21.9 GASTROESOPHAGEAL REFLUX DISEASE, UNSPECIFIED WHETHER ESOPHAGITIS PRESENT: ICD-10-CM

## 2023-11-06 LAB
BILIRUB BLD-MCNC: NEGATIVE MG/DL
CLARITY, POC: ABNORMAL
COLOR UR: ABNORMAL
EXPIRATION DATE: ABNORMAL
GLUCOSE UR STRIP-MCNC: ABNORMAL MG/DL
KETONES UR QL: NEGATIVE
LEUKOCYTE EST, POC: ABNORMAL
Lab: ABNORMAL
NITRITE UR-MCNC: POSITIVE MG/ML
PH UR: 6 [PH] (ref 5–8)
PROT UR STRIP-MCNC: ABNORMAL MG/DL
RBC # UR STRIP: ABNORMAL /UL
SP GR UR: 1.02 (ref 1–1.03)
UROBILINOGEN UR QL: NORMAL

## 2023-11-06 PROCEDURE — 82043 UR ALBUMIN QUANTITATIVE: CPT | Performed by: PHYSICIAN ASSISTANT

## 2023-11-06 PROCEDURE — 3008F BODY MASS INDEX DOCD: CPT | Performed by: PHYSICIAN ASSISTANT

## 2023-11-06 PROCEDURE — 81003 URINALYSIS AUTO W/O SCOPE: CPT | Performed by: PHYSICIAN ASSISTANT

## 2023-11-06 PROCEDURE — 1160F RVW MEDS BY RX/DR IN RCRD: CPT | Performed by: PHYSICIAN ASSISTANT

## 2023-11-06 PROCEDURE — 3046F HEMOGLOBIN A1C LEVEL >9.0%: CPT | Performed by: PHYSICIAN ASSISTANT

## 2023-11-06 PROCEDURE — 87077 CULTURE AEROBIC IDENTIFY: CPT | Performed by: PHYSICIAN ASSISTANT

## 2023-11-06 PROCEDURE — 3074F SYST BP LT 130 MM HG: CPT | Performed by: PHYSICIAN ASSISTANT

## 2023-11-06 PROCEDURE — 1159F MED LIST DOCD IN RCRD: CPT | Performed by: PHYSICIAN ASSISTANT

## 2023-11-06 PROCEDURE — 87086 URINE CULTURE/COLONY COUNT: CPT | Performed by: PHYSICIAN ASSISTANT

## 2023-11-06 PROCEDURE — 3078F DIAST BP <80 MM HG: CPT | Performed by: PHYSICIAN ASSISTANT

## 2023-11-06 PROCEDURE — 82570 ASSAY OF URINE CREATININE: CPT | Performed by: PHYSICIAN ASSISTANT

## 2023-11-06 PROCEDURE — 87186 SC STD MICRODIL/AGAR DIL: CPT | Performed by: PHYSICIAN ASSISTANT

## 2023-11-06 PROCEDURE — 99396 PREV VISIT EST AGE 40-64: CPT | Performed by: PHYSICIAN ASSISTANT

## 2023-11-06 PROCEDURE — 2014F MENTAL STATUS ASSESS: CPT | Performed by: PHYSICIAN ASSISTANT

## 2023-11-06 RX ORDER — SERTRALINE HYDROCHLORIDE 100 MG/1
100 TABLET, FILM COATED ORAL DAILY
Qty: 90 TABLET | Refills: 1 | Status: SHIPPED | OUTPATIENT
Start: 2023-11-06

## 2023-11-06 RX ORDER — PANTOPRAZOLE SODIUM 40 MG/1
40 TABLET, DELAYED RELEASE ORAL DAILY
Qty: 90 TABLET | Refills: 1 | Status: SHIPPED | OUTPATIENT
Start: 2023-11-06

## 2023-11-06 RX ORDER — LISINOPRIL 5 MG/1
5 TABLET ORAL DAILY
Qty: 30 TABLET | Refills: 1 | Status: SHIPPED | OUTPATIENT
Start: 2023-11-06

## 2023-11-06 NOTE — PROGRESS NOTES
[facility]  [address]  [facility] [address]  Dept: [phone]  Dept Fax: [phone]  Loc: [phone]  Loc Fax: [phone]    Marta Polanco  1965    Follow Up Office Visit Note    History of Present Illness:  Patient is a 58-year-old female in today for annual physical and follow-up for high blood pressure, depression, and GERD, and diabetes.  Patient on lisinopril 10 mg daily as directed but blood pressure been dropping low recently and feeling dizzy when she stands up.    On Zoloft 100 mg daily as directed with any problems or side effects for depression.  Reports good symptomatic control.    On omeprazole 40 mg daily for acid reflux as directed but having breakthrough symptoms.  Would like something stronger.    Patient needing A1c rechecked and microalbumin rechecked.    Patient overall doing well and feeling well.        Dysuria   This is a chronic problem. The current episode started 1 to 4 weeks ago. The problem occurs every urination. The problem has been rapidly worsening. The quality of the pain is described as burning and stabbing. The pain is at a severity of 9/10. There has been no fever. The fever has been present for Less than 1 day. She is Not sexually active. There is A history of pyelonephritis. Associated symptoms include chills, flank pain, frequency, hesitancy, nausea, sweats, urgency and vomiting. Pertinent negatives include no discharge, hematuria or possible pregnancy.   Urinary Tract Infection   Associated symptoms include chills, flank pain, frequency, hesitancy, nausea, sweats, urgency and vomiting. Pertinent negatives include no discharge, hematuria or possible pregnancy.   Hypertension  Associated symptoms include sweats. Pertinent negatives include no chest pain, headaches, palpitations or shortness of breath.   Diabetes  Hypoglycemia symptoms include sweats. Pertinent negatives for hypoglycemia include no  confusion, dizziness, headaches or nervousness/anxiousness. Pertinent negatives for diabetes include no chest pain and no fatigue.   Heartburn  She complains of nausea. She reports no chest pain, no coughing, no sore throat or no wheezing. Pertinent negatives include no fatigue.       The following portions of the patient's history were reviewed and updated as appropriate: allergies, current medications, past family history, past medical history, past social history, past surgical history, and problem list.    Medications:    Current Outpatient Medications:     atenolol (TENORMIN) 50 MG tablet, TAKE 1 TABLET BY MOUTH DAILY., Disp: 90 tablet, Rfl: 0    atorvastatin (LIPITOR) 10 MG tablet, TAKE 1 TABLET BY MOUTH EVERY NIGHT., Disp: 30 tablet, Rfl: 4    buprenorphine-naloxone (SUBOXONE) 8-2 MG per SL tablet, , Disp: , Rfl:     Cholecalciferol (Vitamin D3) 50 MCG (2000 UT) capsule, TAKE 1 TABLET BY MOUTH DAILY., Disp: 90 capsule, Rfl: 0    clotrimazole (LOTRIMIN) 1 % cream, Apply  topically to the appropriate area as directed 2 (Two) Times a Day., Disp: 85 g, Rfl: 1    famotidine (PEPCID) 20 MG tablet, Take 1 tablet by mouth Every Morning., Disp: 90 tablet, Rfl: 1    fluconazole (DIFLUCAN) 100 MG tablet, Take 1 tablet by mouth daily for 2 doses. If not resolved, complete the remaining 3 tablets - 1 tablet by mouth daily. 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, Disp: 5 tablet, Rfl: 0    gabapentin (NEURONTIN) 300 MG capsule, Take 1 capsule by mouth 3 (Three) Times a Day., Disp: 30 capsule, Rfl: 0    hydrOXYzine (ATARAX) 50 MG tablet, TAKE 1 TABLET BY MOUTH EVERY 8 (EIGHT) HOURS AS NEEDED FOR ITCHING., Disp: 270 tablet, Rfl: 0    ibuprofen (ADVIL,MOTRIN) 800 MG tablet, TAKE 1 TABLET BY MOUTH EVERY 8 (EIGHT) HOURS AS NEEDED FOR MILD PAIN., Disp: 90 tablet, Rfl: 0    insulin detemir (Levemir FlexPen) 100 UNIT/ML injection, Inject 18 Units under the skin into the appropriate area as directed  Every Night., Disp: 15 mL, Rfl: 11    Insulin Lispro, 1 Unit Dial, (HUMALOG) 100 UNIT/ML solution pen-injector, Inject into skin 3 times daily with meals as directed per sliding scale provided by provider. Do not exceed 40 units in 24 hours., Disp: 100 mL, Rfl: 11    Lancets (Safety Lancet 30G/Pressure Act) misc, , Disp: , Rfl:     lisinopril (PRINIVIL,ZESTRIL) 5 MG tablet, Take 1 tablet by mouth Daily., Disp: 30 tablet, Rfl: 1    miconazole (MICOTIN) 2 % cream, Apply 1 application topically to the appropriate area as directed Every 12 (Twelve) Hours., Disp: , Rfl:     naloxone (NARCAN) 4 MG/0.1ML nasal spray, 1 spray into the nostril(s) as directed by provider As Needed., Disp: , Rfl:     phenazopyridine (PYRIDIUM) 100 MG tablet, Take 1 tablet by mouth 3 (Three) Times a Day As Needed for Bladder Spasms., Disp: 10 tablet, Rfl: 0    promethazine (PHENERGAN) 25 MG suppository, INSERT 1 SUPPOSITORY INTO THE RECTUM EVERY 6 (SIX) HOURS AS NEEDED FOR NAUSEA OR VOMITING., Disp: 30 suppository, Rfl: 0    promethazine (PHENERGAN) 25 MG tablet, Take 1 tablet by mouth Every 8 (Eight) Hours As Needed for Nausea or Vomiting., Disp: 12 tablet, Rfl: 0    sertraline (ZOLOFT) 100 MG tablet, Take 1 tablet by mouth Daily., Disp: 90 tablet, Rfl: 1    tiZANidine (ZANAFLEX) 4 MG tablet, TAKE 1 TABLET BY MOUTH EVERY 8 (EIGHT) HOURS AS NEEDED FOR MUSCLE SPASMS., Disp: 90 tablet, Rfl: 0    traZODone (DESYREL) 150 MG tablet, Take 1 tablet by mouth At Night As Needed for Sleep., Disp: 90 tablet, Rfl: 1    pantoprazole (PROTONIX) 40 MG EC tablet, Take 1 tablet by mouth Daily., Disp: 90 tablet, Rfl: 1    Subjective  Allergies   Allergen Reactions    Ambien [Zolpidem] Other (See Comments)    Ketorolac Hives and Shortness Of Breath    Ketorolac Tromethamine Anaphylaxis    Tetracycline Rash    Tramadol Hives and Shortness Of Breath    Sumatriptan Dizziness    Sulfa Antibiotics Hives    Morphine Rash    Ondansetron Headache    Penicillins Rash         Past Medical History:   Diagnosis Date    Anxiety 89    I have PTSD. Started after my sister was murder    Colon polyp 22    Depression 89    Diabetes mellitus 22    GERD (gastroesophageal reflux disease)     Headache     Hypertension     Low back pain     Neuromuscular disorder     Pancreatitis     In the hospital twice for this    Substance abuse 89    Im clean and sober now    Urinary tract infection 11/15/22    Visual impairment 3/4/99       Past Surgical History:   Procedure Laterality Date    APPENDECTOMY  86     SECTION  4/10/84 2/3/86    Had 2 daughter's    CHOLECYSTECTOMY  86    COLONOSCOPY  22    HERNIA REPAIR  80    HYSTERECTOMY  89       Family History   Problem Relation Age of Onset    Hypertension Father         Social History     Socioeconomic History    Marital status:    Tobacco Use    Smoking status: Every Day     Packs/day: 1.00     Years: 15.00     Additional pack years: 0.00     Total pack years: 15.00     Types: Cigarettes, Electronic Cigarette   Vaping Use    Vaping Use: Never used   Substance and Sexual Activity    Alcohol use: Never    Drug use: Not Currently     Types: Barbiturates, Benzodiazepines, Heroin, Marijuana    Sexual activity: Not Currently     Partners: Male     Birth control/protection: None, Hysterectomy       Review of Systems   Constitutional:  Positive for chills. Negative for activity change, fatigue, fever and unexpected weight change.   HENT:  Negative for congestion, ear pain, postnasal drip, sinus pressure and sore throat.    Eyes:  Negative for pain, discharge and redness.   Respiratory:  Negative for cough, shortness of breath and wheezing.    Cardiovascular:  Negative for chest pain, palpitations and leg swelling.   Gastrointestinal:  Positive for nausea and vomiting. Negative for diarrhea.   Endocrine: Negative for cold intolerance and heat intolerance.   Genitourinary:  Positive for dysuria, flank  "pain, frequency, hesitancy and urgency. Negative for decreased urine volume and hematuria.   Musculoskeletal:  Negative for arthralgias and myalgias.   Skin:  Negative for rash and wound.   Neurological:  Negative for dizziness, light-headedness and headaches.   Hematological:  Does not bruise/bleed easily.   Psychiatric/Behavioral:  Negative for confusion, dysphoric mood and sleep disturbance. The patient is not nervous/anxious.          Objective  Vitals:    11/06/23 1304   BP: 98/60   BP Location: Left arm   Patient Position: Sitting   Cuff Size: Adult   Pulse: 92   Temp: 97.3 °F (36.3 °C)   TempSrc: Temporal   SpO2: 96%   Weight: 81.6 kg (179 lb 12.8 oz)   Height: 165.1 cm (65\")     Body mass index is 29.92 kg/m².     Physical Exam  Physical Exam  Vitals and nursing note reviewed.   Constitutional:       General: She is not in acute distress.     Appearance: She is not ill-appearing.   HENT:      Head: Normocephalic.      Right Ear: Tympanic membrane, ear canal and external ear normal. There is no impacted cerumen.      Left Ear: Tympanic membrane, ear canal and external ear normal. There is no impacted cerumen.      Nose: No congestion or rhinorrhea.      Mouth/Throat:      Mouth: Mucous membranes are moist.      Pharynx: Oropharynx is clear. No oropharyngeal exudate or posterior oropharyngeal erythema.   Eyes:      General:         Right eye: No discharge.         Left eye: No discharge.      Extraocular Movements: Extraocular movements intact.      Conjunctiva/sclera: Conjunctivae normal.      Pupils: Pupils are equal, round, and reactive to light.   Cardiovascular:      Rate and Rhythm: Normal rate and regular rhythm.      Heart sounds: Normal heart sounds. No murmur heard.     No friction rub. No gallop.   Pulmonary:      Effort: Pulmonary effort is normal. No respiratory distress.      Breath sounds: Normal breath sounds. No wheezing.   Abdominal:      General: Bowel sounds are normal. There is no " distension.      Palpations: Abdomen is soft. There is no mass.      Tenderness: There is no abdominal tenderness.   Musculoskeletal:         General: No swelling. Normal range of motion.      Cervical back: Normal range of motion. No tenderness.      Right lower leg: No edema.      Left lower leg: No edema.   Lymphadenopathy:      Cervical: No cervical adenopathy.   Skin:     Findings: No bruising, erythema or rash.   Neurological:      Mental Status: She is oriented to person, place, and time.      Gait: Gait normal.   Psychiatric:         Mood and Affect: Mood normal.         Behavior: Behavior normal.         Thought Content: Thought content normal.         Judgment: Judgment normal.         Diagnostic Data  Procedures    Assessment  Diagnoses and all orders for this visit:    1. Annual physical exam (Primary)  -     CBC (No Diff)  -     Comprehensive Metabolic Panel  -     TSH Rfx On Abnormal To Free T4  -     Hemoglobin A1c; Future  -     Lipid Panel    2. Depression, unspecified depression type    3. History of drug abuse in remission    4. Gastroesophageal reflux disease, unspecified whether esophagitis present    5. Encounter for screening mammogram for malignant neoplasm of breast  -     Mammo Screening Digital Tomosynthesis Bilateral With CAD    6. Screen for colon cancer  -     Cologuard - Stool, Per Rectum; Future    7. Type 2 diabetes mellitus without complication, with long-term current use of insulin  -     Microalbumin / Creatinine Urine Ratio - Urine, Clean Catch; Future    8. Encounter for diabetic foot exam  -     Ambulatory Referral to Podiatry    Other orders  -     sertraline (ZOLOFT) 100 MG tablet; Take 1 tablet by mouth Daily.  Dispense: 90 tablet; Refill: 1  -     pantoprazole (PROTONIX) 40 MG EC tablet; Take 1 tablet by mouth Daily.  Dispense: 90 tablet; Refill: 1  -     lisinopril (PRINIVIL,ZESTRIL) 5 MG tablet; Take 1 tablet by mouth Daily.  Dispense: 30 tablet; Refill:  1        Plan    1. Annual physical exam (Primary)- obtain fasting labs and follow-up with these.    2. Depression, unspecified depression type- on Zoloft 100 mg daily.  Keep same.  Refilled med.  Continue to monitor.    3. 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  Was recently switched from Suboxone to methadone.    4. Gastroesophageal reflux disease, unspecified whether esophagitis present- uncontrolled on omeprazole 40 mg daily.  Switch to Protonix 40 mg daily.    5. Encounter for screening mammogram for malignant neoplasm of breast- ordered mammogram.    6. Screen for colon cancer- ordered Cologuard.    7. Type 2 diabetes mellitus without complication, with long-term current use of insulin- repeat A1c and microalbumin.    8. Encounter for diabetic foot exam- Ambulatory Referral to Podiatry    9. HTN- having issues of hypotension.  Decrease lisinopril to 5 mg daily. Advised patient to take blood pressure readings at home 2-3 times daily. Advised if blood pressure higher than 160/100 or lower than 100/60 to call the office immediately. If symptomatic, go to the ER. Patient verbalized understanding of all instructions given and complied.      Return in about 2 weeks (around 11/20/2023) for Recheck.    Chris Hogan PA-C  11/06/2023  Answers submitted by the patient for this visit:  Primary Reason for Visit (Submitted on 10/16/2023)  What is the primary reason for your visit?: Painful Urination

## 2023-11-07 ENCOUNTER — TELEPHONE (OUTPATIENT)
Dept: INTERNAL MEDICINE | Facility: CLINIC | Age: 58
End: 2023-11-07

## 2023-11-07 LAB
ALBUMIN UR-MCNC: 5 MG/DL
CREAT UR-MCNC: 120 MG/DL
MICROALBUMIN/CREAT UR: 41.7 MG/G (ref 0–29)

## 2023-11-07 RX ORDER — NITROFURANTOIN 25; 75 MG/1; MG/1
100 CAPSULE ORAL 2 TIMES DAILY
Qty: 10 CAPSULE | Refills: 0 | Status: SHIPPED | OUTPATIENT
Start: 2023-11-07 | End: 2023-11-12

## 2023-11-07 NOTE — TELEPHONE ENCOUNTER
Caller: Marta Polanco    Relationship: Self    Best call back number: [phone]    What medication are you requesting: ANTIBIOTIC    What are your current symptoms: RAGING UTI    If a prescription is needed, what is your preferred pharmacy and phone number: [facility]  [place], [place] - [address] - [phone]  - [phone] FX     Additional notes: PATIENT STATES THAT THE ANTIBIOTIC WAS SUPPOSED TO BEEN SENT YESTERDAY

## 2023-11-08 LAB — BACTERIA SPEC AEROBE CULT: ABNORMAL

## 2023-11-09 RX ORDER — TIZANIDINE 4 MG/1
4 TABLET ORAL EVERY 8 HOURS PRN
Qty: 90 TABLET | Refills: 0 | Status: SHIPPED | OUTPATIENT
Start: 2023-11-09

## 2023-11-13 ENCOUNTER — TELEPHONE (OUTPATIENT)
Dept: INTERNAL MEDICINE | Facility: CLINIC | Age: 58
End: 2023-11-13
Payer: COMMERCIAL

## 2023-11-13 RX ORDER — PHENAZOPYRIDINE HYDROCHLORIDE 100 MG/1
100 TABLET, FILM COATED ORAL 3 TIMES DAILY PRN
Qty: 10 TABLET | Refills: 0 | Status: SHIPPED | OUTPATIENT
Start: 2023-11-13

## 2023-11-13 NOTE — TELEPHONE ENCOUNTER
Caller: Marta Polanco    Relationship: Self    Best call back number:       [phone] (Mobile)     Requested Prescriptions:     promethazine (PHENERGAN) 25 MG tablet     Pharmacy where request should be sent:     [facility] [place] [address] - [phone]  - [phone] FX     Last office visit with prescribing clinician: 11/6/2023   Last telemedicine visit with prescribing clinician: Visit date not found   Next office visit with prescribing clinician: 11/20/2023     Additional details provided by patient:     PATIENT STATED SHE IS COMPLETELY OUT OF THE MEDICATION IN TABLET FORM    PATIENT ALSO STATED SHE IS EXPERIENCING DIARRHEA CURRENTLY    Does the patient have less than a 3 day supply:  [x] Yes  [] No    Would you like a call back once the refill request has been completed: [] Yes [] No    If the office needs to give you a call back, can they leave a voicemail: [] Yes [] No    Alina Frank Rep   11/13/23 11:49 EST

## 2023-11-13 NOTE — TELEPHONE ENCOUNTER
PT CALLED REGARDING REFILL FOR PROMETHAZINE 25 MG TABLETS. SHE STATES SHE IS CURRENTLY EXPERIENCING DIARRHEA AND DRY HEAVING. PRESCRIPTION SENT IN TODAY WAS FOR PYRIDIUM TABLETS.

## 2023-11-14 RX ORDER — PROMETHAZINE HYDROCHLORIDE 25 MG/1
25 TABLET ORAL EVERY 8 HOURS PRN
Qty: 12 TABLET | Refills: 0 | Status: SHIPPED | OUTPATIENT
Start: 2023-11-14

## 2023-11-22 RX ORDER — PROMETHAZINE HYDROCHLORIDE 25 MG/1
25 SUPPOSITORY RECTAL EVERY 6 HOURS PRN
Qty: 30 SUPPOSITORY | Refills: 0 | Status: SHIPPED | OUTPATIENT
Start: 2023-11-22

## 2023-11-22 NOTE — TELEPHONE ENCOUNTER
Caller: Marta Polanco    Relationship: Self    Best call back number: [phone]     Requested Prescriptions:   Requested Prescriptions     Pending Prescriptions Disp Refills    promethazine (PHENERGAN) 25 MG suppository 30 suppository 0    AND NEEDLES THAT SCREW ONTO HER LEVEMIR FLEXPEN    Pharmacy where request should be sent: [facility] [place], [place] - [address] - [phone]  - [phone] FX     Last office visit with prescribing clinician: 11/6/2023   Last telemedicine visit with prescribing clinician: Visit date not found   Next office visit with prescribing clinician: Visit date not found         Does the patient have less than a 3 day supply:  [x] Yes  [] No    Would you like a call back once the refill request has been completed: [] Yes [x] No    If the office needs to give you a call back, can they leave a voicemail: [] Yes [x] No    Alina Mathews Rep   11/22/23 12:29 EST

## 2023-12-05 RX ORDER — PROMETHAZINE HYDROCHLORIDE 25 MG/1
25 TABLET ORAL EVERY 8 HOURS PRN
Qty: 12 TABLET | Refills: 0 | Status: SHIPPED | OUTPATIENT
Start: 2023-12-05

## 2023-12-11 RX ORDER — PROMETHAZINE HYDROCHLORIDE 25 MG/1
25 SUPPOSITORY RECTAL EVERY 6 HOURS PRN
Qty: 30 SUPPOSITORY | Refills: 0 | Status: SHIPPED | OUTPATIENT
Start: 2023-12-11

## 2023-12-12 NOTE — TELEPHONE ENCOUNTER
Caller: Marta Polanco    Relationship: Self    Best call back number: [phone]     Requested Prescriptions:   Requested Prescriptions     Pending Prescriptions Disp Refills    insulin detemir (Levemir FlexPen) 100 UNIT/ML injection 15 mL 11     Sig: Inject 18 Units under the skin into the appropriate area as directed Every Night.    Insulin Lispro, 1 Unit Dial, (HUMALOG) 100 UNIT/ML solution pen-injector 100 mL 11     Sig: Inject into skin 3 times daily with meals as directed per sliding scale provided by provider. Do not exceed 40 units in 24 hours.        Pharmacy where request should be sent: [address] - [phone]  - [phone] FX     Last office visit with prescribing clinician: 11/6/2023   Last telemedicine visit with prescribing clinician: Visit date not found   Next office visit with prescribing clinician: Visit date not found     Additional details provided by patient: PATIENT IS OUT OF INSULINS.    Does the patient have less than a 3 day supply:  [x] Yes  [] No    Would you like a call back once the refill request has been completed: [x] Yes [] No    If the office needs to give you a call back, can they leave a voicemail: [x] Yes [] No    Alina Zheng Rep   12/12/23 16:03 EST

## 2023-12-13 RX ORDER — INSULIN DETEMIR 100 [IU]/ML
18 INJECTION, SOLUTION SUBCUTANEOUS NIGHTLY
Qty: 15 ML | Refills: 11 | Status: SHIPPED | OUTPATIENT
Start: 2023-12-13

## 2023-12-13 RX ORDER — INSULIN LISPRO 100 [IU]/ML
40 INJECTION, SOLUTION INTRAVENOUS; SUBCUTANEOUS DAILY
Qty: 15 ML | Refills: 11 | Status: SHIPPED | OUTPATIENT
Start: 2023-12-13

## 2023-12-13 NOTE — TELEPHONE ENCOUNTER
Caller: Marta Polanco    Relationship: Self    Best call back number: [phone]    Requested Prescriptions:   Requested Prescriptions     Pending Prescriptions Disp Refills    insulin detemir (Levemir FlexPen) 100 UNIT/ML injection 15 mL 11     Sig: Inject 18 Units under the skin into the appropriate area as directed Every Night.    Insulin Lispro, 1 Unit Dial, (HUMALOG) 100 UNIT/ML solution pen-injector 100 mL 11     Sig: Inject into skin 3 times daily with meals as directed per sliding scale provided by provider. Do not exceed 40 units in 24 hours.        Pharmacy where request should be sent: [place], [place] - [address] [place] [address] - [phone]  - [phone] FX     Last office visit with prescribing clinician: 11/6/2023   Last telemedicine visit with prescribing clinician: Visit date not found   Next office visit with prescribing clinician: Visit date not found     Additional details provided by patient: PT IS OUT AND NEEDS REFILLED ASAP HER SUGAR IS RUNNING HIGH AT THIS TIME    Does the patient have less than a 3 day supply:  [x] Yes  [] No    Would you like a call back once the refill request has been completed: [x] Yes [] No    If the office needs to give you a call back, can they leave a voicemail: [] Yes [] No    Alina eHard Rep   12/13/23 11:40 EST

## 2023-12-19 RX ORDER — PROMETHAZINE HYDROCHLORIDE 25 MG/1
25 TABLET ORAL EVERY 8 HOURS PRN
Qty: 12 TABLET | Refills: 0 | Status: SHIPPED | OUTPATIENT
Start: 2023-12-19

## 2023-12-19 RX ORDER — ATENOLOL 50 MG/1
TABLET ORAL
Qty: 90 TABLET | Refills: 0 | Status: SHIPPED | OUTPATIENT
Start: 2023-12-19

## 2023-12-26 RX ORDER — PROMETHAZINE HYDROCHLORIDE 25 MG/1
25 TABLET ORAL EVERY 8 HOURS PRN
Qty: 12 TABLET | Refills: 0 | Status: SHIPPED | OUTPATIENT
Start: 2023-12-26

## 2024-01-02 RX ORDER — PROMETHAZINE HYDROCHLORIDE 25 MG/1
25 SUPPOSITORY RECTAL EVERY 6 HOURS PRN
Qty: 30 SUPPOSITORY | Refills: 0 | Status: SHIPPED | OUTPATIENT
Start: 2024-01-02

## 2024-01-02 RX ORDER — LISINOPRIL 5 MG/1
5 TABLET ORAL DAILY
Qty: 30 TABLET | Refills: 0 | Status: SHIPPED | OUTPATIENT
Start: 2024-01-02

## 2024-01-03 ENCOUNTER — TELEPHONE (OUTPATIENT)
Dept: INTERNAL MEDICINE | Facility: CLINIC | Age: 59
End: 2024-01-03

## 2024-01-03 RX ORDER — VALACYCLOVIR HYDROCHLORIDE 1 G/1
1000 TABLET, FILM COATED ORAL 2 TIMES DAILY
Qty: 20 TABLET | Refills: 0 | Status: SHIPPED | OUTPATIENT
Start: 2024-01-03 | End: 2024-01-13

## 2024-01-03 NOTE — TELEPHONE ENCOUNTER
Caller: Marta Polanco    Relationship: Self    Best call back number: [phone]     What are your current symptoms: HERPES BREAKOUT IN PRIVATE AREA     How long have you been experiencing symptoms:   A WEEK     Have you had these symptoms before:    [x] Yes  [] No    Have you been treated for these symptoms before:   [x] Yes  [] No    If a prescription is needed, what is your preferred pharmacy and phone number:    [facility] [place], [place] - [address] - [phone]  - [phone]  [phone]     Additional notes:  PATIENT WOULD LIKE FOR A MEDICATION TO BE CALLED INTO THE PHARMACY TO HELP CLEAR UP A HERPES BREAKOUT

## 2024-01-11 RX ORDER — PROMETHAZINE HYDROCHLORIDE 25 MG/1
25 TABLET ORAL EVERY 8 HOURS PRN
Qty: 12 TABLET | Refills: 0 | Status: SHIPPED | OUTPATIENT
Start: 2024-01-11

## 2024-01-18 RX ORDER — HYDROXYZINE 50 MG/1
50 TABLET, FILM COATED ORAL EVERY 8 HOURS PRN
Qty: 270 TABLET | Refills: 0 | Status: SHIPPED | OUTPATIENT
Start: 2024-01-18

## 2024-01-18 RX ORDER — PROMETHAZINE HYDROCHLORIDE 25 MG/1
25 TABLET ORAL EVERY 8 HOURS PRN
Qty: 12 TABLET | Refills: 0 | Status: SHIPPED | OUTPATIENT
Start: 2024-01-18

## 2024-01-24 RX ORDER — PROMETHAZINE HYDROCHLORIDE 25 MG/1
25 TABLET ORAL EVERY 8 HOURS PRN
Qty: 12 TABLET | Refills: 0 | Status: SHIPPED | OUTPATIENT
Start: 2024-01-24

## 2024-02-05 RX ORDER — TRAZODONE HYDROCHLORIDE 150 MG/1
150 TABLET ORAL NIGHTLY PRN
Qty: 90 TABLET | Refills: 0 | Status: SHIPPED | OUTPATIENT
Start: 2024-02-05

## 2024-02-05 RX ORDER — LISINOPRIL 5 MG/1
5 TABLET ORAL DAILY
Qty: 30 TABLET | Refills: 0 | OUTPATIENT
Start: 2024-02-05

## 2024-02-05 RX ORDER — PROMETHAZINE HYDROCHLORIDE 25 MG/1
25 SUPPOSITORY RECTAL EVERY 6 HOURS PRN
Qty: 30 SUPPOSITORY | Refills: 0 | Status: SHIPPED | OUTPATIENT
Start: 2024-02-05

## 2024-02-22 ENCOUNTER — HOSPITAL ENCOUNTER (EMERGENCY)
Facility: HOSPITAL | Age: 59
Discharge: HOME | End: 2024-02-22
Attending: EMERGENCY MEDICINE
Payer: COMMERCIAL

## 2024-02-22 ENCOUNTER — APPOINTMENT (OUTPATIENT)
Dept: CT IMAGING | Facility: HOSPITAL | Age: 59
End: 2024-02-22
Payer: COMMERCIAL

## 2024-02-22 VITALS
TEMPERATURE: 99.6 F | SYSTOLIC BLOOD PRESSURE: 97 MMHG | HEIGHT: 65 IN | HEART RATE: 70 BPM | WEIGHT: 179 LBS | OXYGEN SATURATION: 96 % | BODY MASS INDEX: 29.82 KG/M2 | RESPIRATION RATE: 15 BRPM | DIASTOLIC BLOOD PRESSURE: 54 MMHG

## 2024-02-22 DIAGNOSIS — N30.00 ACUTE CYSTITIS WITHOUT HEMATURIA: Primary | ICD-10-CM

## 2024-02-22 DIAGNOSIS — S30.0XXA LUMBAR CONTUSION, INITIAL ENCOUNTER: ICD-10-CM

## 2024-02-22 DIAGNOSIS — R73.9 HYPERGLYCEMIA: ICD-10-CM

## 2024-02-22 DIAGNOSIS — S16.1XXA ACUTE STRAIN OF NECK MUSCLE, INITIAL ENCOUNTER: ICD-10-CM

## 2024-02-22 LAB
ALBUMIN SERPL-MCNC: 3.8 G/DL (ref 3.5–5.2)
ALBUMIN/GLOB SERPL: 1.4 G/DL
ALP SERPL-CCNC: 152 U/L (ref 39–117)
ALT SERPL W P-5'-P-CCNC: 10 U/L (ref 1–33)
ANION GAP SERPL CALCULATED.3IONS-SCNC: 11 MMOL/L (ref 5–15)
AST SERPL-CCNC: 13 U/L (ref 1–32)
BACTERIA UR QL AUTO: ABNORMAL /HPF
BASOPHILS # BLD AUTO: 0.05 10*3/MM3 (ref 0–0.2)
BASOPHILS NFR BLD AUTO: 0.7 % (ref 0–1.5)
BILIRUB SERPL-MCNC: 0.2 MG/DL (ref 0–1.2)
BILIRUB UR QL STRIP: NEGATIVE
BUN SERPL-MCNC: 24 MG/DL (ref 6–20)
BUN/CREAT SERPL: 22.4 (ref 7–25)
CALCIUM SPEC-SCNC: 8.8 MG/DL (ref 8.6–10.5)
CHLORIDE SERPL-SCNC: 101 MMOL/L (ref 98–107)
CLARITY UR: CLEAR
CO2 SERPL-SCNC: 22 MMOL/L (ref 22–29)
COLOR UR: YELLOW
CREAT SERPL-MCNC: 1.07 MG/DL (ref 0.57–1)
DEPRECATED RDW RBC AUTO: 41.5 FL (ref 37–54)
EGFRCR SERPLBLD CKD-EPI 2021: 60.7 ML/MIN/1.73
EOSINOPHIL # BLD AUTO: 0.16 10*3/MM3 (ref 0–0.4)
EOSINOPHIL NFR BLD AUTO: 2.2 % (ref 0.3–6.2)
ERYTHROCYTE [DISTWIDTH] IN BLOOD BY AUTOMATED COUNT: 13.9 % (ref 12.3–15.4)
GLOBULIN UR ELPH-MCNC: 2.8 GM/DL
GLUCOSE BLDC GLUCOMTR-MCNC: 473 MG/DL (ref 70–130)
GLUCOSE SERPL-MCNC: 422 MG/DL (ref 65–99)
GLUCOSE UR STRIP-MCNC: ABNORMAL MG/DL
HCT VFR BLD AUTO: 30.5 % (ref 34–46.6)
HGB BLD-MCNC: 9.9 G/DL (ref 12–15.9)
HGB UR QL STRIP.AUTO: NEGATIVE
HYALINE CASTS UR QL AUTO: ABNORMAL /LPF
IMM GRANULOCYTES # BLD AUTO: 0.03 10*3/MM3 (ref 0–0.05)
IMM GRANULOCYTES NFR BLD AUTO: 0.4 % (ref 0–0.5)
KETONES UR QL STRIP: NEGATIVE
LEUKOCYTE ESTERASE UR QL STRIP.AUTO: NEGATIVE
LYMPHOCYTES # BLD AUTO: 3.17 10*3/MM3 (ref 0.7–3.1)
LYMPHOCYTES NFR BLD AUTO: 43.5 % (ref 19.6–45.3)
MCH RBC QN AUTO: 26.6 PG (ref 26.6–33)
MCHC RBC AUTO-ENTMCNC: 32.5 G/DL (ref 31.5–35.7)
MCV RBC AUTO: 82 FL (ref 79–97)
MONOCYTES # BLD AUTO: 0.62 10*3/MM3 (ref 0.1–0.9)
MONOCYTES NFR BLD AUTO: 8.5 % (ref 5–12)
NEUTROPHILS NFR BLD AUTO: 3.25 10*3/MM3 (ref 1.7–7)
NEUTROPHILS NFR BLD AUTO: 44.7 % (ref 42.7–76)
NITRITE UR QL STRIP: POSITIVE
NRBC BLD AUTO-RTO: 0 /100 WBC (ref 0–0.2)
PH UR STRIP.AUTO: 5.5 [PH] (ref 5–8)
PLATELET # BLD AUTO: 242 10*3/MM3 (ref 140–450)
PMV BLD AUTO: 9.1 FL (ref 6–12)
POTASSIUM SERPL-SCNC: 4.6 MMOL/L (ref 3.5–5.2)
PROT SERPL-MCNC: 6.6 G/DL (ref 6–8.5)
PROT UR QL STRIP: NEGATIVE
RBC # BLD AUTO: 3.72 10*6/MM3 (ref 3.77–5.28)
RBC # UR STRIP: ABNORMAL /HPF
REF LAB TEST METHOD: ABNORMAL
SODIUM SERPL-SCNC: 134 MMOL/L (ref 136–145)
SP GR UR STRIP: 1.02 (ref 1–1.03)
SQUAMOUS #/AREA URNS HPF: ABNORMAL /HPF
UROBILINOGEN UR QL STRIP: ABNORMAL
WBC # UR STRIP: ABNORMAL /HPF
WBC NRBC COR # BLD AUTO: 7.28 10*3/MM3 (ref 3.4–10.8)

## 2024-02-22 PROCEDURE — 72131 CT LUMBAR SPINE W/O DYE: CPT

## 2024-02-22 PROCEDURE — 82948 REAGENT STRIP/BLOOD GLUCOSE: CPT

## 2024-02-22 PROCEDURE — 81001 URINALYSIS AUTO W/SCOPE: CPT | Performed by: EMERGENCY MEDICINE

## 2024-02-22 PROCEDURE — 87086 URINE CULTURE/COLONY COUNT: CPT | Performed by: EMERGENCY MEDICINE

## 2024-02-22 PROCEDURE — 36415 COLL VENOUS BLD VENIPUNCTURE: CPT

## 2024-02-22 PROCEDURE — 72125 CT NECK SPINE W/O DYE: CPT

## 2024-02-22 PROCEDURE — 85025 COMPLETE CBC W/AUTO DIFF WBC: CPT | Performed by: EMERGENCY MEDICINE

## 2024-02-22 PROCEDURE — 99284 EMERGENCY DEPT VISIT MOD MDM: CPT

## 2024-02-22 PROCEDURE — 25810000003 SODIUM CHLORIDE 0.9 % SOLUTION: Performed by: EMERGENCY MEDICINE

## 2024-02-22 PROCEDURE — 80053 COMPREHEN METABOLIC PANEL: CPT | Performed by: EMERGENCY MEDICINE

## 2024-02-22 PROCEDURE — 87186 SC STD MICRODIL/AGAR DIL: CPT | Performed by: EMERGENCY MEDICINE

## 2024-02-22 PROCEDURE — 87077 CULTURE AEROBIC IDENTIFY: CPT | Performed by: EMERGENCY MEDICINE

## 2024-02-22 RX ORDER — CEFUROXIME AXETIL 500 MG/1
500 TABLET ORAL 2 TIMES DAILY
Qty: 14 TABLET | Refills: 0 | Status: SHIPPED | OUTPATIENT
Start: 2024-02-22 | End: 2024-02-29

## 2024-02-22 RX ORDER — ACETAMINOPHEN 500 MG
1000 TABLET ORAL ONCE
Status: DISCONTINUED | OUTPATIENT
Start: 2024-02-22 | End: 2024-02-22 | Stop reason: HOSPADM

## 2024-02-22 RX ORDER — DROPERIDOL 2.5 MG/ML
2.5 INJECTION, SOLUTION INTRAMUSCULAR; INTRAVENOUS ONCE
Status: DISCONTINUED | OUTPATIENT
Start: 2024-02-22 | End: 2024-02-22 | Stop reason: HOSPADM

## 2024-02-22 RX ADMIN — SODIUM CHLORIDE 1000 ML: 9 INJECTION, SOLUTION INTRAVENOUS at 14:54

## 2024-02-22 NOTE — ED PROVIDER NOTES
[place]    EMERGENCY DEPARTMENT ENCOUNTER      Pt Name: Marta Polanco  MRN: [id number]  YOB: 1965  Date of evaluation: 2024  Provider: Cornelius Harrington MD    CHIEF COMPLAINT     No chief complaint on file.        HISTORY OF PRESENT ILLNESS   Marta Polanco is a 59 y.o. female who presents to the emergency department ***       Nursing notes were reviewed.    REVIEW OF SYSTEMS     ROS:  A chief complaint appropriate review of systems was completed and is negative except as noted in the HPI.      PAST MEDICAL HISTORY     Past Medical History:   Diagnosis Date    Anxiety 90    I have PTSD. Started after my sister was murder    Colon polyp 22    Depression 89    Diabetes mellitus 22    GERD (gastroesophageal reflux disease)     Headache     Hypertension     Low back pain     Neuromuscular disorder     Pancreatitis     In the hospital twice for this    Substance abuse 95    Im clean and sober now    Urinary tract infection 11/15/22    Visual impairment 3/4/99         SURGICAL HISTORY       Past Surgical History:   Procedure Laterality Date    APPENDECTOMY  84     SECTION  4/10/84 2/3/86    Had 2 daughter's    CHOLECYSTECTOMY  84    COLONOSCOPY  20    HERNIA REPAIR  78    HYSTERECTOMY  96         CURRENT MEDICATIONS     No current facility-administered medications for this encounter.    Current Outpatient Medications:     atenolol (TENORMIN) 50 MG tablet, TAKE 1 TABLET BY MOUTH DAILY., Disp: 90 tablet, Rfl: 0    atorvastatin (LIPITOR) 10 MG tablet, TAKE 1 TABLET BY MOUTH EVERY NIGHT., Disp: 30 tablet, Rfl: 4    buprenorphine-naloxone (SUBOXONE) 8-2 MG per SL tablet, , Disp: , Rfl:     Cholecalciferol (Vitamin D3) 50 MCG (2000) capsule, TAKE 1 TABLET BY MOUTH DAILY., Disp: 90 capsule, Rfl: 0    clotrimazole (LOTRIMIN) 1 % cream, Apply  topically to the appropriate area as directed 2 (Two) Times a Day., Disp: 85 g, Rfl: 1    famotidine (PEPCID)  20 MG tablet, Take 1 tablet by mouth Every Morning., Disp: 90 tablet, Rfl: 1    fluconazole (DIFLUCAN) 100 MG tablet, Take 1 tablet by mouth daily for 2 doses. If not resolved, complete the remaining 3 tablets - 1 tablet by mouth daily. 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, Disp: 5 tablet, Rfl: 0    gabapentin (NEURONTIN) 300 MG capsule, Take 1 capsule by mouth 3 (Three) Times a Day., Disp: 30 capsule, Rfl: 0    hydrOXYzine (ATARAX) 50 MG tablet, TAKE 1 TABLET BY MOUTH EVERY 8 (EIGHT) HOURS AS NEEDED FOR ITCHING., Disp: 270 tablet, Rfl: 0    ibuprofen (ADVIL,MOTRIN) 800 MG tablet, TAKE 1 TABLET BY MOUTH EVERY 8 (EIGHT) HOURS AS NEEDED FOR MILD PAIN., Disp: 90 tablet, Rfl: 0    insulin detemir (Levemir FlexPen) 100 UNIT/ML injection, Inject 18 Units under the skin into the appropriate area as directed Every Night., Disp: 15 mL, Rfl: 11    Insulin Lispro, 1 Unit Dial, (HUMALOG) 100 UNIT/ML solution pen-injector, Inject into skin 3 times daily with meals as directed per sliding scale provided by provider. Do not exceed 40 units in 24 hours., Disp: 15 mL, Rfl: 11    Insulin Pen Needle 31G X 8 MM misc, Use 1 each Daily., Disp: 100 each, Rfl: 2    Lancets (Safety Lancet 30G/Pressure Act) misc, , Disp: , Rfl:     lisinopril (PRINIVIL,ZESTRIL) 5 MG tablet, TAKE 1 TABLET BY MOUTH DAILY., Disp: 30 tablet, Rfl: 0    miconazole (MICOTIN) 2 % cream, Apply 1 application topically to the appropriate area as directed Every 12 (Twelve) Hours., Disp: , Rfl:     naloxone (NARCAN) 4 MG/0.1ML nasal spray, 1 spray into the nostril(s) as directed by provider As Needed., Disp: , Rfl:     pantoprazole (PROTONIX) 40 MG EC tablet, Take 1 tablet by mouth Daily., Disp: 90 tablet, Rfl: 1    phenazopyridine (PYRIDIUM) 100 MG tablet, Take 1 tablet by mouth 3 (Three) Times a Day As Needed for Bladder Spasms., Disp: 10 tablet, Rfl: 0    promethazine (PHENERGAN) 25 MG suppository, INSERT 1 SUPPOSITORY INTO THE RECTUM  EVERY 6 (SIX) HOURS AS NEEDED FOR NAUSEA OR VOMITING., Disp: 30 suppository, Rfl: 0    promethazine (PHENERGAN) 25 MG tablet, TAKE 1 TABLET BY MOUTH EVERY 8 (EIGHT) HOURS AS NEEDED FOR NAUSEA OR VOMITING., Disp: 12 tablet, Rfl: 0    sertraline (ZOLOFT) 100 MG tablet, Take 1 tablet by mouth Daily., Disp: 90 tablet, Rfl: 1    tiZANidine (ZANAFLEX) 4 MG tablet, TAKE 1 TABLET BY MOUTH EVERY 8 (EIGHT) HOURS AS NEEDED FOR MUSCLE SPASMS., Disp: 90 tablet, Rfl: 0    traZODone (DESYREL) 150 MG tablet, TAKE 1 TABLET BY MOUTH AT NIGHT AS NEEDED FOR SLEEP., Disp: 90 tablet, Rfl: 0    ALLERGIES     Ambien [zolpidem], Ketorolac, Ketorolac tromethamine, Tetracycline, Tramadol, Sumatriptan, Sulfa antibiotics, Morphine, Ondansetron, and Penicillins    FAMILY HISTORY       Family History   Problem Relation Age of Onset    Hypertension Father           SOCIAL HISTORY       Social History     Socioeconomic History    Marital status:    Tobacco Use    Smoking status: Every Day     Packs/day: 1.00     Years: 15.00     Additional pack years: 0.00     Total pack years: 15.00     Types: Cigarettes, Electronic Cigarette   Vaping Use    Vaping Use: Never used   Substance and Sexual Activity    Alcohol use: Never    Drug use: Not Currently     Types: Barbiturates, Benzodiazepines, Heroin, Marijuana    Sexual activity: Not Currently     Partners: Male     Birth control/protection: None, Hysterectomy         PHYSICAL EXAM    (up to 7 for level 4, 8 or more for level 5)   There were no vitals filed for this visit.    ***      DIAGNOSTIC RESULTS     EKG: All EKGs are interpreted by the Emergency Department Physician who either signs or Co-signs this chart in the absence of a cardiologist.    No orders to display         RADIOLOGY:   [x] Radiologist's Report Reviewed:  No orders to display       I ordered and independently reviewed the above noted radiographic studies.        LABS:    I have reviewed and interpreted all of the currently  available lab results from this visit (if applicable):  Results for orders placed or performed in visit on 11/06/23   Urine Culture - Urine, Urine, Clean Catch    Specimen: Urine, Clean Catch   Result Value Ref Range    Urine Culture >100,000 CFU/mL Escherichia coli (A)        Susceptibility    Escherichia coli - GOPI     Ampicillin  Susceptible ug/ml     Ampicillin + Sulbactam  Susceptible ug/ml     Cefazolin  Susceptible ug/ml     Cefepime  Susceptible ug/ml     Ceftazidime  Susceptible ug/ml     Ceftriaxone  Susceptible ug/ml     Gentamicin  Susceptible ug/ml     Levofloxacin  Resistant ug/ml     Nitrofurantoin  Susceptible ug/ml     Piperacillin + Tazobactam  Susceptible ug/ml     Trimethoprim + Sulfamethoxazole  Susceptible ug/ml   Microalbumin / Creatinine Urine Ratio - Urine, Clean Catch    Specimen: Urine, Clean Catch   Result Value Ref Range    Microalbumin/Creatinine Ratio 41.7 (H) 0.0 - 29.0 mg/g    Creatinine, Urine 120.0 mg/dL    Microalbumin, Urine 5.0 mg/dL   POCT urinalysis dipstick, automated    Specimen: Urine   Result Value Ref Range    Color Straw Yellow, Straw, Dark Yellow, Hanh    Clarity, UA Cloudy (A) Clear    Specific Gravity  1.020 1.005 - 1.030    pH, Urine 6.0 5.0 - 8.0    Leukocytes Small (1+) (A) Negative    Nitrite, UA Positive (A) Negative    Protein, POC Trace (A) Negative mg/dL    Glucose, UA 1000 mg/dL (3+) Negative mg/dL    Ketones, UA Negative Negative    Urobilinogen, UA Normal Normal, 0.2 E.U./dL    Bilirubin Negative Negative    Blood, UA 3+ (A) Negative    Lot Number [id number]     Expiration Date 10/25/1024         If labs were ordered, I independently reviewed the results and considered them in treating the patient.      EMERGENCY DEPARTMENT COURSE and DIFFERENTIAL DIAGNOSIS/MDM:   Vitals:  AS OF 12:03 EST    BP -    HR -    TEMP -    O2 SATS -          Discussion below represents my analysis of pertinent findings related to patient's condition, differential diagnosis,  treatment plan and final disposition.      Differential diagnosis:  The differential diagnosis associated with the patient's presentation includes: ***      Independent interpretations (ECG/rhythm strip/X-ray/US/CT scan): ***      Additional sources:  Discussed/obtained information from independent historians:   [] Spouse:   [] Parent:   [] Friend:   [] EMS:   [] Other:  External (non-ED) record review:   [] Inpatient record:   [] Office record:   [] Outpatient record:   [] Prior Outpatient labs:   [] Prior Outpatient radiology:   [] Primary Care record:   [] Outside ED record:   [] Other:       Patient's care impacted by:   [] Diabetes   [] Hypertension   [] Coronary Artery Disease   [] Cancer   [] Other:     Care significantly affected by Social Determinants of Health (housing and economic circumstances, unemployment)    [] Yes     [] No   If yes, Patient's care significantly limited by  Social Determinants of Health including:    [] Inadequate housing    [] Low income    [] Alcoholism and drug addiction in family    [] Problems related to primary support group    [] Unemployment    [] Problems related to employment    [] Other Social Determinants of Health:       Consideration of admission/observation vs discharge: ***      I considered prescription management with: ***   [] Pain medication:   [] Antiviral:   [] Antibiotic:   [] Other:    Additional orders considered but not ordered:  The following testing was considered but ultimately not selected after discussion with patient/family: ***    ED Course:           ***    I had a discussion with the patient/family regarding diagnosis, diagnostic results, treatment plan, and medications.  The patient/family indicated understanding of these instructions.  I spent adequate time at the bedside preceding discharge necessary to personally discuss the aftercare instructions, giving patient education, providing explanations of the results of our evaluations/findings, and  my decision making to assure that the patient/family understand the plan of care.  Time was allotted to answer questions at that time and throughout the ED course.  Emphasis was placed on timely follow-up after discharge.  I also discussed the potential for the development of an acute emergent condition requiring further evaluation, admission, or even surgical intervention. I discussed that we found nothing during the visit today indicating the need for further workup, admission, or the presence of an unstable medical condition.  I encouraged the patient to return to the emergency department immediately for ANY concerns, worsening, new complaints, or if symptoms persist and unable to seek follow-up in a timely fashion.  The patient/family expressed understanding and agreement with this plan.  The patient will follow-up with their PCP in 1-2 days for reevaluation.           PROCEDURES:  Procedures    CRITICAL CARE TIME        FINAL IMPRESSION    No diagnosis found.      DISPOSITION/PLAN     ED Disposition       None              Comment: Please note this report has been produced using speech recognition software.      Cronelius Harrington MD  Attending Emergency Physician                 Patient's care impacted by:   [x] Diabetes   [x] Hypertension   [] Coronary Artery Disease   [] Cancer   [] Other:     Care significantly affected by Social Determinants of Health (housing and economic circumstances, unemployment)    [] Yes     [x] No   If yes, Patient's care significantly limited by  Social Determinants of Health including:    [] Inadequate housing    [] Low income    [] Alcoholism and drug addiction in family    [] Problems related to primary support group    [] Unemployment    [] Problems related to employment    [] Other Social Determinants of Health:       Consideration of admission/observation vs discharge: Pt well appearing w no evidence of emergent pathology and is stable for discharge home      I considered prescription management with:    [] Pain medication:   [] Antiviral:   [x] Antibiotic: rx ceftin   [] Other:    ED Course:    ED Course as of 03/13/24 1243   Thu Feb 22, 2024   1735 On reexamination, patient marco a well-appearing and nontoxic.  She does have some evidence of urinary tract infection.  Will place order for culture.  Patient has hyperglycemia but admits to not taking her insulin today.  There is no evidence of DKA or HHS.  Hemoglobin is 9.9 but patient is Hemoccult negative.  In terms of her fall, there is no evidence of significant trauma to the neck or lower back. [NS]      ED Course User Index  [NS] Cornelius Harrington MD             I had a discussion with the patient/family regarding diagnosis, diagnostic results, treatment plan, and medications.  The patient/family indicated understanding of these instructions.  I spent adequate time at the bedside preceding discharge necessary to personally discuss the aftercare instructions, giving patient education, providing explanations of the results of our evaluations/findings, and my decision making to assure that the patient/family understand the plan of care.  Time was allotted to answer questions at that time and  throughout the ED course.  Emphasis was placed on timely follow-up after discharge.  I also discussed the potential for the development of an acute emergent condition requiring further evaluation, admission, or even surgical intervention. I discussed that we found nothing during the visit today indicating the need for further workup, admission, or the presence of an unstable medical condition.  I encouraged the patient to return to the emergency department immediately for ANY concerns, worsening, new complaints, or if symptoms persist and unable to seek follow-up in a timely fashion.  The patient/family expressed understanding and agreement with this plan.  The patient will follow-up with their PCP in 1-2 days for reevaluation.           PROCEDURES:  Procedures    CRITICAL CARE TIME        FINAL IMPRESSION      1. Acute cystitis without hematuria    2. Acute strain of neck muscle, initial encounter    3. Lumbar contusion, initial encounter    4. Hyperglycemia          DISPOSITION/PLAN     ED Disposition       ED Disposition   Discharge    Condition   Stable    Comment   --                 Comment: Please note this report has been produced using speech recognition software.      Cornelius Harrington MD  Attending Emergency Physician             Cornelius Harrington MD  03/13/24 3410

## 2024-02-23 RX ORDER — LISINOPRIL 5 MG/1
5 TABLET ORAL DAILY
Qty: 30 TABLET | Refills: 0 | Status: SHIPPED | OUTPATIENT
Start: 2024-02-23

## 2024-02-23 RX ORDER — PROMETHAZINE HYDROCHLORIDE 25 MG/1
25 SUPPOSITORY RECTAL EVERY 6 HOURS PRN
Qty: 30 SUPPOSITORY | Refills: 0 | Status: SHIPPED | OUTPATIENT
Start: 2024-02-23

## 2024-02-23 RX ORDER — PHENAZOPYRIDINE HYDROCHLORIDE 100 MG/1
100 TABLET, FILM COATED ORAL 3 TIMES DAILY PRN
Qty: 10 TABLET | Refills: 0 | Status: SHIPPED | OUTPATIENT
Start: 2024-02-23

## 2024-02-23 NOTE — CASE MANAGEMENT/SOCIAL WORK
Continued Stay Note  [facility]     Patient Name: Marta Polanco  MRN: [id number]  Today's Date: 2/22/2024    Admit Date: 2/22/2024    Plan: Lyft to home   Discharge Plan       Row Name 02/22/24 1949       Plan    Plan Lyft to home    Plan Comments RN came to [facility] needing assistance with transportation. [facility] arranged a Lyft for pt. upon her d/c. [facility] informed info desk of make, model and ETA of vehicle. [facility] is available.    Final Discharge Disposition Code 01 - home or self-care                   Discharge Codes    No documentation.                       CHANG Gonzalez

## 2024-02-23 NOTE — TELEPHONE ENCOUNTER
Caller: Marta Polanco    Relationship: Self    Best call back number:      Requested Prescriptions:   Requested Prescriptions     Pending Prescriptions Disp Refills    lisinopril (PRINIVIL,ZESTRIL) 5 MG tablet 30 tablet 0     Sig: Take 1 tablet by mouth Daily.    phenazopyridine (PYRIDIUM) 100 MG tablet 10 tablet 0     Sig: Take 1 tablet by mouth 3 (Three) Times a Day As Needed for Bladder Spasms.    promethazine (PHENERGAN) 25 MG suppository 30 suppository 0     Sig: Insert 1 suppository into the rectum Every 6 (Six) Hours As Needed for Nausea or Vomiting.    tiZANidine (ZANAFLEX) 4 MG tablet     Pharmacy where request should be sent: [facility]  [place], [place] - [address] [place] [address] LN - [phone]  - [phone] FX     Last office visit with prescribing clinician: 11/6/2023   Last telemedicine visit with prescribing clinician: Visit date not found   Next office visit with prescribing clinician: 3/1/2024     Additional details provided by patient: PATIENT IS OUT OF MEDCATIONS    Does the patient have less than a 3 day supply:  [x] Yes  [] No      Alina uRiz Rep   02/23/24 09:15 EST

## 2024-02-24 LAB — BACTERIA SPEC AEROBE CULT: ABNORMAL

## 2024-02-27 RX ORDER — TIZANIDINE 4 MG/1
4 TABLET ORAL EVERY 8 HOURS PRN
Qty: 90 TABLET | Refills: 0 | Status: SHIPPED | OUTPATIENT
Start: 2024-02-27

## 2024-02-27 RX ORDER — PHENAZOPYRIDINE HYDROCHLORIDE 100 MG/1
100 TABLET, FILM COATED ORAL 3 TIMES DAILY PRN
Qty: 10 TABLET | Refills: 0 | Status: SHIPPED | OUTPATIENT
Start: 2024-02-27

## 2024-02-27 NOTE — TELEPHONE ENCOUNTER
Caller: Marta Polanco    Relationship: Self    Best call back number: [phone]     Requested Prescriptions:   Requested Prescriptions     Pending Prescriptions Disp Refills    phenazopyridine (PYRIDIUM) 100 MG tablet 10 tablet 0     Sig: Take 1 tablet by mouth 3 (Three) Times a Day As Needed for Bladder Spasms.    tiZANidine (ZANAFLEX) 4 MG tablet 90 tablet 0     Sig: Take 1 tablet by mouth Every 8 (Eight) Hours As Needed for Muscle Spasms.        Pharmacy where request should be sent: [address] - [phone]  - [phone]     Last office visit with prescribing clinician: 11/6/2023   Last telemedicine visit with prescribing clinician: Visit date not found   Next office visit with prescribing clinician: 3/1/2024     Additional details provided by patient: NEEDS TODAY PLEASE    Does the patient have less than a 3 day supply:  [x] Yes  [] No    Would you like a call back once the refill request has been completed: [] Yes [x] No    If the office needs to give you a call back, can they leave a voicemail: [] Yes [x] No    Alina Mathews Rep   02/27/24 09:18 EST

## 2024-02-29 RX ORDER — OMEPRAZOLE 40 MG/1
40 CAPSULE, DELAYED RELEASE ORAL DAILY
Qty: 90 CAPSULE | Refills: 0 | Status: SHIPPED | OUTPATIENT
Start: 2024-02-29

## 2024-03-07 ENCOUNTER — OFFICE VISIT (OUTPATIENT)
Dept: INTERNAL MEDICINE | Facility: CLINIC | Age: 59
End: 2024-03-07
Payer: COMMERCIAL

## 2024-03-07 ENCOUNTER — LAB (OUTPATIENT)
Dept: INTERNAL MEDICINE | Facility: CLINIC | Age: 59
End: 2024-03-07
Payer: COMMERCIAL

## 2024-03-07 VITALS
OXYGEN SATURATION: 97 % | WEIGHT: 186 LBS | SYSTOLIC BLOOD PRESSURE: 92 MMHG | BODY MASS INDEX: 30.99 KG/M2 | DIASTOLIC BLOOD PRESSURE: 56 MMHG | HEART RATE: 76 BPM | TEMPERATURE: 97.3 F | HEIGHT: 65 IN

## 2024-03-07 DIAGNOSIS — N39.0 ACUTE UTI: ICD-10-CM

## 2024-03-07 DIAGNOSIS — G43.909 MIGRAINE SYNDROME: Primary | ICD-10-CM

## 2024-03-07 DIAGNOSIS — Z00.00 ANNUAL PHYSICAL EXAM: ICD-10-CM

## 2024-03-07 DIAGNOSIS — R25.2 MUSCLE CRAMP: ICD-10-CM

## 2024-03-07 DIAGNOSIS — R19.7 ACUTE DIARRHEA: ICD-10-CM

## 2024-03-07 DIAGNOSIS — R40.0 SOMNOLENCE: ICD-10-CM

## 2024-03-07 DIAGNOSIS — G47.00 INSOMNIA, UNSPECIFIED TYPE: ICD-10-CM

## 2024-03-07 LAB
AMPHET+METHAMPHET UR QL: NEGATIVE
AMPHETAMINES UR QL: NEGATIVE
BARBITURATES UR QL SCN: NEGATIVE
BENZODIAZ UR QL SCN: POSITIVE
BILIRUB BLD-MCNC: NEGATIVE MG/DL
BUPRENORPHINE SERPL-MCNC: POSITIVE NG/ML
CANNABINOIDS SERPL QL: NEGATIVE
CHOLEST SERPL-MCNC: 216 MG/DL (ref 0–200)
CLARITY, POC: ABNORMAL
COCAINE UR QL: NEGATIVE
COLOR UR: YELLOW
DEPRECATED RDW RBC AUTO: 39.6 FL (ref 37–54)
ERYTHROCYTE [DISTWIDTH] IN BLOOD BY AUTOMATED COUNT: 13.9 % (ref 12.3–15.4)
EXPIRATION DATE: ABNORMAL
FENTANYL UR-MCNC: NEGATIVE NG/ML
GLUCOSE UR STRIP-MCNC: ABNORMAL MG/DL
HCT VFR BLD AUTO: 33.8 % (ref 34–46.6)
HDLC SERPL-MCNC: 36 MG/DL (ref 40–60)
HGB BLD-MCNC: 10.5 G/DL (ref 12–15.9)
KETONES UR QL: NEGATIVE
LDLC SERPL CALC-MCNC: 141 MG/DL (ref 0–100)
LDLC/HDLC SERPL: 3.81 {RATIO}
LEUKOCYTE EST, POC: ABNORMAL
Lab: ABNORMAL
MAGNESIUM SERPL-MCNC: 1.8 MG/DL (ref 1.6–2.6)
MCH RBC QN AUTO: 24.5 PG (ref 26.6–33)
MCHC RBC AUTO-ENTMCNC: 31.1 G/DL (ref 31.5–35.7)
MCV RBC AUTO: 79 FL (ref 79–97)
METHADONE UR QL SCN: NEGATIVE
NITRITE UR-MCNC: NEGATIVE MG/ML
OPIATES UR QL: NEGATIVE
OXYCODONE UR QL SCN: NEGATIVE
PCP UR QL SCN: NEGATIVE
PH UR: 6 [PH] (ref 5–8)
PLATELET # BLD AUTO: 351 10*3/MM3 (ref 140–450)
PMV BLD AUTO: 9.6 FL (ref 6–12)
PROT UR STRIP-MCNC: ABNORMAL MG/DL
RBC # BLD AUTO: 4.28 10*6/MM3 (ref 3.77–5.28)
RBC # UR STRIP: ABNORMAL /UL
SP GR UR: 1.01 (ref 1–1.03)
TRICYCLICS UR QL SCN: NEGATIVE
TRIGL SERPL-MCNC: 214 MG/DL (ref 0–150)
UROBILINOGEN UR QL: NORMAL
VLDLC SERPL-MCNC: 39 MG/DL (ref 5–40)
WBC NRBC COR # BLD AUTO: 9.68 10*3/MM3 (ref 3.4–10.8)

## 2024-03-07 PROCEDURE — 83735 ASSAY OF MAGNESIUM: CPT | Performed by: PHYSICIAN ASSISTANT

## 2024-03-07 PROCEDURE — 80050 GENERAL HEALTH PANEL: CPT | Performed by: PHYSICIAN ASSISTANT

## 2024-03-07 PROCEDURE — 82607 VITAMIN B-12: CPT | Performed by: PHYSICIAN ASSISTANT

## 2024-03-07 PROCEDURE — 83036 HEMOGLOBIN GLYCOSYLATED A1C: CPT | Performed by: PHYSICIAN ASSISTANT

## 2024-03-07 PROCEDURE — 80307 DRUG TEST PRSMV CHEM ANLYZR: CPT | Performed by: PHYSICIAN ASSISTANT

## 2024-03-07 PROCEDURE — 80061 LIPID PANEL: CPT | Performed by: PHYSICIAN ASSISTANT

## 2024-03-07 PROCEDURE — 82746 ASSAY OF FOLIC ACID SERUM: CPT | Performed by: PHYSICIAN ASSISTANT

## 2024-03-07 PROCEDURE — 87086 URINE CULTURE/COLONY COUNT: CPT | Performed by: PHYSICIAN ASSISTANT

## 2024-03-07 RX ORDER — CIPROFLOXACIN 500 MG/1
500 TABLET, FILM COATED ORAL 2 TIMES DAILY
Qty: 20 TABLET | Refills: 0 | Status: SHIPPED | OUTPATIENT
Start: 2024-03-07 | End: 2024-03-17

## 2024-03-07 RX ORDER — RIMEGEPANT SULFATE 75 MG/75MG
75 TABLET, ORALLY DISINTEGRATING ORAL EVERY OTHER DAY
Qty: 30 TABLET | Refills: 1 | Status: SHIPPED | OUTPATIENT
Start: 2024-03-07 | End: 2024-03-11

## 2024-03-07 NOTE — PROGRESS NOTES
[facility] PRIMARY CARE  [address]  [facility] [address]  Dept: [phone]  Dept Fax: [phone]  Loc: [phone]  Loc Fax: [phone]    Marta Polanco  1965    Follow Up Office Visit Note    History of Present Illness:  Patient is a 59-year-old female in today for acute diarrhea and dysuria.  Patient feels as if she has urinary tract infection again.  Has been having diarrhea off and on for the last 2 weeks.  More frequent this week.  Patient also having nausea.  Patient requests Phenergan multiple times throughout the visit specifically.  According to patient she is allergic to Zofran and Kaopectate.  States Phenergan is the only thing that allows her stomach to calm down enough for her to eat.    Patient continues to struggle with migraines.  Reports she has tried Topamax, Imitrex, and multiple over-the-counter medicines and had side effects to all of these.  Patient requesting Fioricet.    Patient also having pain in her legs at night.  Reports sharp and tingly pain.  Feels like muscle cramps at times.    Patient on trazodone at night but states this medicine no longer works for her.  Would like something stronger.    Dizziness  Associated symptoms include arthralgias, headaches, myalgias and nausea. Pertinent negatives include no chest pain, chills, congestion, coughing, fatigue, fever, rash, sore throat or vomiting.   Fall  Associated symptoms include headaches and nausea. Pertinent negatives include no fever or vomiting.   Headache  Diarrhea   Associated symptoms include arthralgias, headaches and myalgias. Pertinent negatives include no chills, coughing, fever or vomiting.   Urinary Tract Infection   Associated symptoms include nausea. Pertinent negatives include no chills or vomiting.       The following portions of the patient's history were reviewed and updated as appropriate: allergies, current medications, past family history, past  medical history, past social history, past surgical history, and problem list.    Medications:    Current Outpatient Medications:     atenolol (TENORMIN) 50 MG tablet, TAKE 1 TABLET BY MOUTH DAILY., Disp: 90 tablet, Rfl: 0    atorvastatin (LIPITOR) 10 MG tablet, TAKE 1 TABLET BY MOUTH EVERY NIGHT., Disp: 30 tablet, Rfl: 4    buprenorphine-naloxone (SUBOXONE) 8-2 MG per SL tablet, , Disp: , Rfl:     Cholecalciferol (Vitamin D3) 50 MCG (2000 UT) capsule, TAKE 1 TABLET BY MOUTH DAILY., Disp: 90 capsule, Rfl: 0    clotrimazole (LOTRIMIN) 1 % cream, Apply  topically to the appropriate area as directed 2 (Two) Times a Day., Disp: 85 g, Rfl: 1    famotidine (PEPCID) 20 MG tablet, Take 1 tablet by mouth Every Morning., Disp: 90 tablet, Rfl: 1    fluconazole (DIFLUCAN) 100 MG tablet, Take 1 tablet by mouth daily for 2 doses. If not resolved, complete the remaining 3 tablets - 1 tablet by mouth daily. 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, Disp: 5 tablet, Rfl: 0    gabapentin (NEURONTIN) 300 MG capsule, Take 1 capsule by mouth 3 (Three) Times a Day., Disp: 30 capsule, Rfl: 0    hydrOXYzine (ATARAX) 50 MG tablet, TAKE 1 TABLET BY MOUTH EVERY 8 (EIGHT) HOURS AS NEEDED FOR ITCHING., Disp: 270 tablet, Rfl: 0    ibuprofen (ADVIL,MOTRIN) 800 MG tablet, TAKE 1 TABLET BY MOUTH EVERY 8 (EIGHT) HOURS AS NEEDED FOR MILD PAIN., Disp: 90 tablet, Rfl: 0    insulin detemir (Levemir FlexPen) 100 UNIT/ML injection, Inject 18 Units under the skin into the appropriate area as directed Every Night., Disp: 15 mL, Rfl: 11    Insulin Lispro, 1 Unit Dial, (HUMALOG) 100 UNIT/ML solution pen-injector, Inject into skin 3 times daily with meals as directed per sliding scale provided by provider. Do not exceed 40 units in 24 hours., Disp: 15 mL, Rfl: 11    Insulin Pen Needle 31G X 8 MM misc, Use 1 each Daily., Disp: 100 each, Rfl: 2    Lancets (Safety Lancet 30G/Pressure Act) misc, , Disp: , Rfl:     miconazole (MICOTIN) 2  % cream, Apply 1 application topically to the appropriate area as directed Every 12 (Twelve) Hours., Disp: , Rfl:     naloxone (NARCAN) 4 MG/0.1ML nasal spray, 1 spray into the nostril(s) as directed by provider As Needed., Disp: , Rfl:     omeprazole (priLOSEC) 40 MG capsule, TAKE 1 CAPSULE BY MOUTH DAILY., Disp: 90 capsule, Rfl: 0    pantoprazole (PROTONIX) 40 MG EC tablet, Take 1 tablet by mouth Daily., Disp: 90 tablet, Rfl: 1    phenazopyridine (PYRIDIUM) 100 MG tablet, Take 1 tablet by mouth 3 (Three) Times a Day As Needed for Bladder Spasms., Disp: 10 tablet, Rfl: 0    sertraline (ZOLOFT) 100 MG tablet, Take 1 tablet by mouth Daily., Disp: 90 tablet, Rfl: 1    traZODone (DESYREL) 150 MG tablet, TAKE 1 TABLET BY MOUTH AT NIGHT AS NEEDED FOR SLEEP., Disp: 90 tablet, Rfl: 0    ciprofloxacin (Cipro) 500 MG tablet, Take 1 tablet by mouth 2 (Two) Times a Day for 10 days., Disp: 20 tablet, Rfl: 0    Rimegepant Sulfate (Nurtec) 75 MG tablet dispersible tablet, Take 1 tablet by mouth Every Other Day., Disp: 30 tablet, Rfl: 1    Subjective  Allergies   Allergen Reactions    Ambien [Zolpidem] Other (See Comments)    Ketorolac Hives and Shortness Of Breath    Ketorolac Tromethamine Anaphylaxis    Tetracycline Rash    Tramadol Hives and Shortness Of Breath    Sumatriptan Dizziness    Sulfa Antibiotics Hives    Morphine Rash    Ondansetron Headache    Penicillins Rash        Past Medical History:   Diagnosis Date    Anxiety 89    I have PTSD. Started after my sister was murder    Colon polyp 22    Depression 89    Diabetes mellitus 22    GERD (gastroesophageal reflux disease)     Headache     Hypertension     Low back pain     Neuromuscular disorder     Pancreatitis     In the hospital twice for this    Substance abuse 89    Im clean and sober now    Urinary tract infection 11/15/22    Visual impairment 3/4/99       Past Surgical History:   Procedure Laterality Date    APPENDECTOMY  86      SECTION  4/10/84 2/3/86    Had 2 daughter's    CHOLECYSTECTOMY  4/17/84    COLONOSCOPY  8/8/20    HERNIA REPAIR  5/9/78    HYSTERECTOMY  5/6/96       Family History   Problem Relation Age of Onset    Hypertension Father         Social History     Socioeconomic History    Marital status:    Tobacco Use    Smoking status: Every Day     Current packs/day: 1.00     Average packs/day: 1 pack/day for 15.0 years (15.0 ttl pk-yrs)     Types: Cigarettes, Electronic Cigarette    Smokeless tobacco: Never   Vaping Use    Vaping status: Never Used   Substance and Sexual Activity    Alcohol use: Never    Drug use: Not Currently     Types: Barbiturates, Benzodiazepines, Heroin, Marijuana    Sexual activity: Not Currently     Partners: Male     Birth control/protection: None, Hysterectomy       Review of Systems   Constitutional:  Negative for activity change, chills, fatigue, fever and unexpected weight change.   HENT:  Negative for congestion, ear pain, postnasal drip, sinus pressure and sore throat.    Eyes:  Negative for pain, discharge and redness.   Respiratory:  Negative for cough, shortness of breath and wheezing.    Cardiovascular:  Negative for chest pain, palpitations and leg swelling.   Gastrointestinal:  Positive for diarrhea and nausea. Negative for vomiting.   Endocrine: Negative for cold intolerance and heat intolerance.   Genitourinary:  Negative for decreased urine volume and dysuria.   Musculoskeletal:  Positive for arthralgias and myalgias.   Skin:  Negative for rash and wound.   Neurological:  Positive for dizziness and headaches. Negative for light-headedness.   Hematological:  Does not bruise/bleed easily.   Psychiatric/Behavioral:  Negative for confusion, dysphoric mood and sleep disturbance. The patient is not nervous/anxious.          Objective  Vitals:    03/07/24 1351   BP: 92/56   BP Location: Left arm   Patient Position: Sitting   Cuff Size: Adult   Pulse: 76   Temp: 97.3 °F (36.3 °C)  "  TempSrc: Temporal   SpO2: 97%   Weight: 84.4 kg (186 lb)   Height: 165.1 cm (65\")     Body mass index is 30.95 kg/m².     Physical Exam  Physical Exam  Vitals and nursing note reviewed.   Constitutional:       General: She is not in acute distress.     Appearance: She is not ill-appearing.   HENT:      Head: Normocephalic.      Right Ear: Tympanic membrane, ear canal and external ear normal. There is no impacted cerumen.      Left Ear: Tympanic membrane, ear canal and external ear normal. There is no impacted cerumen.      Nose: No congestion or rhinorrhea.      Mouth/Throat:      Mouth: Mucous membranes are moist.      Pharynx: Oropharynx is clear. No oropharyngeal exudate or posterior oropharyngeal erythema.   Eyes:      General:         Right eye: No discharge.         Left eye: No discharge.      Extraocular Movements: Extraocular movements intact.      Conjunctiva/sclera: Conjunctivae normal.      Pupils: Pupils are equal, round, and reactive to light.   Cardiovascular:      Rate and Rhythm: Normal rate and regular rhythm.      Heart sounds: Normal heart sounds. No murmur heard.     No friction rub. No gallop.   Pulmonary:      Effort: Pulmonary effort is normal. No respiratory distress.      Breath sounds: Normal breath sounds. No wheezing.   Abdominal:      General: Bowel sounds are normal. There is no distension.      Palpations: Abdomen is soft. There is no mass.      Tenderness: There is no abdominal tenderness.   Musculoskeletal:         General: No swelling. Normal range of motion.      Cervical back: Normal range of motion. No tenderness.      Right lower leg: No edema.      Left lower leg: No edema.   Lymphadenopathy:      Cervical: No cervical adenopathy.   Skin:     Findings: No bruising, erythema or rash.   Neurological:      Mental Status: She is oriented to person, place, and time.      Gait: Gait normal.   Psychiatric:         Thought Content: Thought content normal.         Judgment: Judgment " normal.      Comments: Patient is somnolent on exam.         Diagnostic Data  Procedures    Assessment  Diagnoses and all orders for this visit:    1. Migraine syndrome (Primary)    2. Acute diarrhea  -     Stool Culture (Reference Lab) - Stool, Per Rectum; Future  -     Clostridioides difficile Toxin, PCR - Stool, Per Rectum; Future  -     Ova & Parasite Examination - Stool, Per Rectum    3. Acute UTI  -     Urine Culture - Urine, Urine, Clean Catch; Future  -     POCT urinalysis dipstick, automated    4. Insomnia, unspecified type  -     Ambulatory Referral to Psychiatry    5. Muscle cramp  -     CBC (No Diff)  -     Comprehensive Metabolic Panel  -     Magnesium; Future  -     Vitamin B12; Future  -     Folate; Future    6. Somnolence    Other orders  -     Rimegepant Sulfate (Nurtec) 75 MG tablet dispersible tablet; Take 1 tablet by mouth Every Other Day.  Dispense: 30 tablet; Refill: 1  -     ciprofloxacin (Cipro) 500 MG tablet; Take 1 tablet by mouth 2 (Two) Times a Day for 10 days.  Dispense: 20 tablet; Refill: 0        Plan    1. Migraine syndrome (Primary)- trial of Nurtec.    2. Acute diarrhea- advised on adequate hydration.  Obtain stool studies per above.  Obtain labs.  Advised on signs and symptoms of dehydration and if experiencing these go to ER immediately. Patient verbalized understanding of all instructions given and complied.    3. Acute UTI- Cipro twice daily x 10 days.  Prescribed this due to multiple antibiotic allergies.  Obtain culture and sensitivity.    4. Insomnia, unspecified type- worse, referred to psychiatry.    5. Muscle cramp- obtain labs per above.    6. Somnolence- obtain UDS.      Return in about 2 weeks (around 3/21/2024) for Recheck.    Chris Hogan PA-C  03/07/2024

## 2024-03-08 ENCOUNTER — TELEPHONE (OUTPATIENT)
Dept: INTERNAL MEDICINE | Facility: CLINIC | Age: 59
End: 2024-03-08
Payer: COMMERCIAL

## 2024-03-08 LAB
ALBUMIN SERPL-MCNC: 4 G/DL (ref 3.5–5.2)
ALBUMIN/GLOB SERPL: 1.1 G/DL
ALP SERPL-CCNC: 160 U/L (ref 39–117)
ALT SERPL W P-5'-P-CCNC: 44 U/L (ref 1–33)
ANION GAP SERPL CALCULATED.3IONS-SCNC: 14.2 MMOL/L (ref 5–15)
AST SERPL-CCNC: 93 U/L (ref 1–32)
BACTERIA SPEC AEROBE CULT: NORMAL
BILIRUB SERPL-MCNC: <0.2 MG/DL (ref 0–1.2)
BUN SERPL-MCNC: 31 MG/DL (ref 6–20)
BUN/CREAT SERPL: 18.5 (ref 7–25)
CALCIUM SPEC-SCNC: 9.1 MG/DL (ref 8.6–10.5)
CHLORIDE SERPL-SCNC: 101 MMOL/L (ref 98–107)
CO2 SERPL-SCNC: 19.8 MMOL/L (ref 22–29)
CREAT SERPL-MCNC: 1.68 MG/DL (ref 0.57–1)
EGFRCR SERPLBLD CKD-EPI 2021: 35.3 ML/MIN/1.73
FOLATE SERPL-MCNC: 15.3 NG/ML (ref 4.78–24.2)
GLOBULIN UR ELPH-MCNC: 3.6 GM/DL
GLUCOSE SERPL-MCNC: 305 MG/DL (ref 65–99)
HBA1C MFR BLD: 10.4 % (ref 4.8–5.6)
POTASSIUM SERPL-SCNC: 4.9 MMOL/L (ref 3.5–5.2)
PROT SERPL-MCNC: 7.6 G/DL (ref 6–8.5)
SODIUM SERPL-SCNC: 135 MMOL/L (ref 136–145)
TSH SERPL DL<=0.05 MIU/L-ACNC: 0.95 UIU/ML (ref 0.27–4.2)
VIT B12 BLD-MCNC: 461 PG/ML (ref 211–946)

## 2024-03-08 NOTE — TELEPHONE ENCOUNTER
Hub to relay message below from Chris Hogan to patient    Attempted to reach patient voicemail picks up on the first ring saying number you have dialed has calling restrictions    ----- Message from Chris Hogan PA-C sent at 3/8/2024  1:13 PM EST -----  Also, urine specimen contaminated, patient will need to come back to repeat this.   Rituxan Pregnancy And Lactation Text: This medication is Pregnancy Category C and it isn't know if it is safe during pregnancy. It is unknown if this medication is excreted in breast milk but similar antibodies are known to be excreted.

## 2024-03-11 ENCOUNTER — TELEPHONE (OUTPATIENT)
Dept: INTERNAL MEDICINE | Facility: CLINIC | Age: 59
End: 2024-03-11
Payer: COMMERCIAL

## 2024-03-11 RX ORDER — PANTOPRAZOLE SODIUM 40 MG/1
40 TABLET, DELAYED RELEASE ORAL DAILY
Qty: 90 TABLET | Refills: 1 | Status: SHIPPED | OUTPATIENT
Start: 2024-03-11

## 2024-03-11 RX ORDER — SUMATRIPTAN 100 MG/1
TABLET, FILM COATED ORAL
Qty: 9 TABLET | Refills: 5 | Status: SHIPPED | OUTPATIENT
Start: 2024-03-11

## 2024-03-11 NOTE — TELEPHONE ENCOUNTER
Hub to relay    Attempted to reach patient voicemail picks up on the first ring saying the patient has call restrictions. Couldn't leave a message. If patient calls please read message below from Chris Hogan about medication changes she requested. Also please get an updated phone number. Thank you        I sent in Protonix instead of omeprazole because it is stronger.  Also, sent in Nurtec for migraines, did she not ?  I did send her in Imitrex additionally for the migraines.  Also, her urine specimen was contaminated.  Before I can change her antibiotic she will need to come back and provide a new urine specimen so we can send out for culture and sensitivity study again.

## 2024-03-11 NOTE — TELEPHONE ENCOUNTER
Patient is requesting a different medication other than Cipro, something with out penicillin and pyridium. She says she can't take the Cipro. She is also requesting something for her migraines (not taking anything currently) and her stomach (acid reflux issues and nausea). She says she is taking omeprazole but says it it not enough.

## 2024-03-19 ENCOUNTER — TELEPHONE (OUTPATIENT)
Dept: INTERNAL MEDICINE | Facility: CLINIC | Age: 59
End: 2024-03-19
Payer: COMMERCIAL

## 2024-03-19 DIAGNOSIS — R30.0 BURNING WITH URINATION: Primary | ICD-10-CM

## 2024-03-19 RX ORDER — LISINOPRIL 5 MG/1
5 TABLET ORAL DAILY
Qty: 30 TABLET | Refills: 0 | OUTPATIENT
Start: 2024-03-19

## 2024-03-19 NOTE — TELEPHONE ENCOUNTER
Caller: Marta Polanco    Relationship: Self    Best call back number: [phone]    What medication are you requesting: PROMETHAZINE AND PERIDIUM    What are your current symptoms: STILL HAS UTI    How long have you been experiencing symptoms: 2 WEEKS    Have you had these symptoms before:    [x] Yes  [] No    Have you been treated for these symptoms before:   [x] Yes  [] No    If a prescription is needed, what is your preferred pharmacy and phone number:    [facility] [phone]  Additional notes:  PATIENT STILL HAS UTI AND WOULD LIKE THESE MEDICATIONS FOR IT. PLEASE ADVISE

## 2024-03-19 NOTE — TELEPHONE ENCOUNTER
LVM for pt to return call.     OK FOR HUB TO RELAY MESSAGE      She will need to come in to redo the UA to check for UTI and will likely need urine culture to make sure the antibiotic is correct.  She does not need appointment she can just stop by and provide a specimen.  Thank you.

## 2024-03-20 ENCOUNTER — TELEPHONE (OUTPATIENT)
Dept: INTERNAL MEDICINE | Facility: CLINIC | Age: 59
End: 2024-03-20

## 2024-03-20 NOTE — TELEPHONE ENCOUNTER
Name: Marta Polanco    Relationship: Self    Best Callback Number: [phone]    HUB PROVIDED THE RELAY MESSAGE FROM THE OFFICE   PATIENT VOICED UNDERSTANDING AND HAS NO FURTHER QUESTIONS AT THIS TIME    ADDITIONAL INFORMATION: PATIENT SAID DUE TO TRANSPORTATION ISSUES SHE CANNOT COME IN TO GIVE A URINE SPECIMEN.  SHE HAS AN APPOINTMENT ON MONDAY [id number] AND WILL HAVE TO WAIT UNTIL THEN

## 2024-03-21 ENCOUNTER — APPOINTMENT (OUTPATIENT)
Dept: CT IMAGING | Facility: HOSPITAL | Age: 59
End: 2024-03-21
Payer: COMMERCIAL

## 2024-03-21 ENCOUNTER — HOSPITAL ENCOUNTER (EMERGENCY)
Facility: HOSPITAL | Age: 59
Discharge: HOME | End: 2024-03-21
Attending: EMERGENCY MEDICINE
Payer: COMMERCIAL

## 2024-03-21 VITALS
SYSTOLIC BLOOD PRESSURE: 149 MMHG | DIASTOLIC BLOOD PRESSURE: 82 MMHG | HEIGHT: 65 IN | RESPIRATION RATE: 20 BRPM | HEART RATE: 84 BPM | TEMPERATURE: 98.8 F | OXYGEN SATURATION: 99 % | BODY MASS INDEX: 31 KG/M2 | WEIGHT: 186.07 LBS

## 2024-03-21 DIAGNOSIS — N10 ACUTE PYELONEPHRITIS: Primary | ICD-10-CM

## 2024-03-21 LAB
ALBUMIN SERPL-MCNC: 4.1 G/DL (ref 3.5–5.2)
ALBUMIN/GLOB SERPL: 1.2 G/DL
ALP SERPL-CCNC: 140 U/L (ref 39–117)
ALT SERPL W P-5'-P-CCNC: 17 U/L (ref 1–33)
ANION GAP SERPL CALCULATED.3IONS-SCNC: 10 MMOL/L (ref 5–15)
AST SERPL-CCNC: 17 U/L (ref 1–32)
BACTERIA UR QL AUTO: ABNORMAL /HPF
BASOPHILS # BLD AUTO: 0.08 10*3/MM3 (ref 0–0.2)
BASOPHILS NFR BLD AUTO: 0.9 % (ref 0–1.5)
BILIRUB SERPL-MCNC: 0.2 MG/DL (ref 0–1.2)
BILIRUB UR QL STRIP: NEGATIVE
BILIRUB UR QL STRIP: NEGATIVE
BUN SERPL-MCNC: 35 MG/DL (ref 6–20)
BUN/CREAT SERPL: 33 (ref 7–25)
CALCIUM SPEC-SCNC: 9.4 MG/DL (ref 8.6–10.5)
CHLORIDE SERPL-SCNC: 99 MMOL/L (ref 98–107)
CLARITY UR: ABNORMAL
CLARITY UR: ABNORMAL
CO2 SERPL-SCNC: 24 MMOL/L (ref 22–29)
COLOR UR: YELLOW
COLOR UR: YELLOW
CREAT SERPL-MCNC: 1.06 MG/DL (ref 0.57–1)
D-LACTATE SERPL-SCNC: 1.5 MMOL/L (ref 0.5–2)
DEPRECATED RDW RBC AUTO: 40.4 FL (ref 37–54)
EGFRCR SERPLBLD CKD-EPI 2021: 61.4 ML/MIN/1.73
EOSINOPHIL # BLD AUTO: 0.19 10*3/MM3 (ref 0–0.4)
EOSINOPHIL NFR BLD AUTO: 2.2 % (ref 0.3–6.2)
ERYTHROCYTE [DISTWIDTH] IN BLOOD BY AUTOMATED COUNT: 13.9 % (ref 12.3–15.4)
GLOBULIN UR ELPH-MCNC: 3.4 GM/DL
GLUCOSE SERPL-MCNC: 162 MG/DL (ref 65–99)
GLUCOSE UR STRIP-MCNC: ABNORMAL MG/DL
GLUCOSE UR STRIP-MCNC: ABNORMAL MG/DL
HCT VFR BLD AUTO: 34.8 % (ref 34–46.6)
HGB BLD-MCNC: 11.2 G/DL (ref 12–15.9)
HGB UR QL STRIP.AUTO: ABNORMAL
HGB UR QL STRIP.AUTO: ABNORMAL
HOLD SPECIMEN: NORMAL
HYALINE CASTS UR QL AUTO: ABNORMAL /LPF
IMM GRANULOCYTES # BLD AUTO: 0.03 10*3/MM3 (ref 0–0.05)
IMM GRANULOCYTES NFR BLD AUTO: 0.4 % (ref 0–0.5)
KETONES UR QL STRIP: NEGATIVE
KETONES UR QL STRIP: NEGATIVE
LEUKOCYTE ESTERASE UR QL STRIP.AUTO: ABNORMAL
LEUKOCYTE ESTERASE UR QL STRIP.AUTO: ABNORMAL
LIPASE SERPL-CCNC: 76 U/L (ref 13–60)
LYMPHOCYTES # BLD AUTO: 4.08 10*3/MM3 (ref 0.7–3.1)
LYMPHOCYTES NFR BLD AUTO: 48.1 % (ref 19.6–45.3)
MCH RBC QN AUTO: 25.7 PG (ref 26.6–33)
MCHC RBC AUTO-ENTMCNC: 32.2 G/DL (ref 31.5–35.7)
MCV RBC AUTO: 80 FL (ref 79–97)
MONOCYTES # BLD AUTO: 0.73 10*3/MM3 (ref 0.1–0.9)
MONOCYTES NFR BLD AUTO: 8.6 % (ref 5–12)
NEUTROPHILS NFR BLD AUTO: 3.38 10*3/MM3 (ref 1.7–7)
NEUTROPHILS NFR BLD AUTO: 39.8 % (ref 42.7–76)
NITRITE UR QL STRIP: POSITIVE
NITRITE UR QL STRIP: POSITIVE
NRBC BLD AUTO-RTO: 0 /100 WBC (ref 0–0.2)
PH UR STRIP.AUTO: 5.5 [PH] (ref 5–8)
PH UR STRIP.AUTO: 5.5 [PH] (ref 5–8)
PLATELET # BLD AUTO: 231 10*3/MM3 (ref 140–450)
PMV BLD AUTO: 9.2 FL (ref 6–12)
POTASSIUM SERPL-SCNC: 3.6 MMOL/L (ref 3.5–5.2)
PROT SERPL-MCNC: 7.5 G/DL (ref 6–8.5)
PROT UR QL STRIP: ABNORMAL
PROT UR QL STRIP: ABNORMAL
RBC # BLD AUTO: 4.35 10*6/MM3 (ref 3.77–5.28)
RBC # UR STRIP: ABNORMAL /HPF
REF LAB TEST METHOD: ABNORMAL
SODIUM SERPL-SCNC: 133 MMOL/L (ref 136–145)
SP GR UR STRIP: >=1.03 (ref 1–1.03)
SP GR UR STRIP: >=1.03 (ref 1–1.03)
SQUAMOUS #/AREA URNS HPF: ABNORMAL /HPF
UROBILINOGEN UR QL STRIP: ABNORMAL
UROBILINOGEN UR QL STRIP: ABNORMAL
WBC # UR STRIP: ABNORMAL /HPF
WBC NRBC COR # BLD AUTO: 8.49 10*3/MM3 (ref 3.4–10.8)
WHOLE BLOOD HOLD COAG: NORMAL
WHOLE BLOOD HOLD SPECIMEN: NORMAL

## 2024-03-21 PROCEDURE — 87186 SC STD MICRODIL/AGAR DIL: CPT | Performed by: EMERGENCY MEDICINE

## 2024-03-21 PROCEDURE — 25010000002 CEFTRIAXONE PER 250 MG: Performed by: EMERGENCY MEDICINE

## 2024-03-21 PROCEDURE — 36415 COLL VENOUS BLD VENIPUNCTURE: CPT

## 2024-03-21 PROCEDURE — 83690 ASSAY OF LIPASE: CPT | Performed by: EMERGENCY MEDICINE

## 2024-03-21 PROCEDURE — 25010000002 HALOPERIDOL LACTATE PER 5 MG: Performed by: EMERGENCY MEDICINE

## 2024-03-21 PROCEDURE — 83605 ASSAY OF LACTIC ACID: CPT | Performed by: EMERGENCY MEDICINE

## 2024-03-21 PROCEDURE — 99284 EMERGENCY DEPT VISIT MOD MDM: CPT

## 2024-03-21 PROCEDURE — 81001 URINALYSIS AUTO W/SCOPE: CPT | Performed by: EMERGENCY MEDICINE

## 2024-03-21 PROCEDURE — 87077 CULTURE AEROBIC IDENTIFY: CPT | Performed by: EMERGENCY MEDICINE

## 2024-03-21 PROCEDURE — 80053 COMPREHEN METABOLIC PANEL: CPT | Performed by: EMERGENCY MEDICINE

## 2024-03-21 PROCEDURE — 25810000003 SODIUM CHLORIDE 0.9 % SOLUTION: Performed by: EMERGENCY MEDICINE

## 2024-03-21 PROCEDURE — 87086 URINE CULTURE/COLONY COUNT: CPT | Performed by: EMERGENCY MEDICINE

## 2024-03-21 PROCEDURE — 74176 CT ABD & PELVIS W/O CONTRAST: CPT

## 2024-03-21 PROCEDURE — 96375 TX/PRO/DX INJ NEW DRUG ADDON: CPT

## 2024-03-21 PROCEDURE — 96365 THER/PROPH/DIAG IV INF INIT: CPT

## 2024-03-21 PROCEDURE — 85025 COMPLETE CBC W/AUTO DIFF WBC: CPT | Performed by: EMERGENCY MEDICINE

## 2024-03-21 RX ORDER — SODIUM CHLORIDE 0.9 % (FLUSH) 0.9 %
10 SYRINGE (ML) INJECTION AS NEEDED
Status: DISCONTINUED | OUTPATIENT
Start: 2024-03-21 | End: 2024-03-21 | Stop reason: HOSPADM

## 2024-03-21 RX ORDER — HALOPERIDOL 5 MG/ML
5 INJECTION INTRAMUSCULAR ONCE
Status: COMPLETED | OUTPATIENT
Start: 2024-03-21 | End: 2024-03-21

## 2024-03-21 RX ORDER — SODIUM CHLORIDE 9 MG/ML
10 INJECTION, SOLUTION INTRAMUSCULAR; INTRAVENOUS; SUBCUTANEOUS AS NEEDED
Status: DISCONTINUED | OUTPATIENT
Start: 2024-03-21 | End: 2024-03-21 | Stop reason: HOSPADM

## 2024-03-21 RX ORDER — CEFDINIR 300 MG/1
300 CAPSULE ORAL 2 TIMES DAILY
Qty: 14 CAPSULE | Refills: 0 | Status: SHIPPED | OUTPATIENT
Start: 2024-03-21 | End: 2024-03-28

## 2024-03-21 RX ADMIN — HALOPERIDOL LACTATE 5 MG: 5 INJECTION, SOLUTION INTRAMUSCULAR at 05:09

## 2024-03-21 RX ADMIN — SODIUM CHLORIDE 1000 ML: 9 INJECTION, SOLUTION INTRAVENOUS at 05:09

## 2024-03-21 RX ADMIN — CEFTRIAXONE 2000 MG: 2 INJECTION, POWDER, FOR SOLUTION INTRAMUSCULAR; INTRAVENOUS at 06:05

## 2024-03-21 NOTE — ED PROVIDER NOTES
Subjective   History of Present Illness  Is a 59-year-old female with past medical history of diabetes presenting to the emergency department with some dysuria and some right flank pain.  Patient states that she has had the symptoms for the last 48 hours.  She was recently diagnosed with a urinary tract infection.  She has been taking ciprofloxacin.  Patient states that she finished the course of antibiotics, however over the last 2 days her symptoms returned.  She was having some burning with urination as well as some cramping in the suprapubic area.  Patient also endorses some right-sided flank pain.  Is dull in nature.  Nonradiating.  Patient is had some nausea and vomiting.  Denies any diarrhea.  Has had a fever at home and she did take Tylenol prior to arrival.  Denies any headache or change in vision.  No focal weakness.  No chest pain or shortness of breath.    History provided by:  Patient and EMS personnel   used: No        Review of Systems   Constitutional:  Negative for chills and fever.   HENT:  Negative for congestion, ear pain and sore throat.    Eyes:  Negative for visual disturbance.   Respiratory:  Negative for shortness of breath.    Cardiovascular:  Negative for chest pain.   Gastrointestinal:  Positive for abdominal pain, nausea and vomiting.   Genitourinary:  Positive for flank pain. Negative for difficulty urinating.   Musculoskeletal:  Negative for arthralgias.   Skin:  Negative for rash.   Neurological:  Negative for dizziness, weakness and numbness.   Psychiatric/Behavioral:  Negative for agitation.        Past Medical History:   Diagnosis Date    Anxiety 1/6/90    I have PTSD. Started after my sister was murder    Colon polyp 2/14/20    Depression 4/4/87    Diabetes mellitus 1/14/20    GERD (gastroesophageal reflux disease)     Headache     Hypertension     Low back pain     Neuromuscular disorder     Pancreatitis     In the hospital twice for this    Substance abuse  95    Im clean and sober now    Urinary tract infection 11/15/22    Visual impairment 3/4/99       Allergies   Allergen Reactions    Ambien [Zolpidem] Other (See Comments)    Ketorolac Hives and Shortness Of Breath    Ketorolac Tromethamine Anaphylaxis    Tetracycline Rash    Tramadol Hives and Shortness Of Breath    Sumatriptan Dizziness    Sulfa Antibiotics Hives    Morphine Rash    Ondansetron Headache    Penicillins Rash       Past Surgical History:   Procedure Laterality Date    APPENDECTOMY  84     SECTION  4/10/84 2/3/86    Had 2 daughter's    CHOLECYSTECTOMY  84    COLONOSCOPY  20    HERNIA REPAIR  78    HYSTERECTOMY  96       Family History   Problem Relation Age of Onset    Hypertension Father        Social History     Socioeconomic History    Marital status:    Tobacco Use    Smoking status: Every Day     Current packs/day: 1.00     Average packs/day: 1 pack/day for 15.0 years (15.0 ttl pk-yrs)     Types: Cigarettes, Electronic Cigarette    Smokeless tobacco: Never   Vaping Use    Vaping status: Never Used   Substance and Sexual Activity    Alcohol use: Never    Drug use: Not Currently     Types: Barbiturates, Benzodiazepines, Heroin, Marijuana    Sexual activity: Not Currently     Partners: Male     Birth control/protection: None, Hysterectomy           Objective   Physical Exam  Vitals and nursing note reviewed.   Constitutional:       General: She is not in acute distress.     Appearance: She is not ill-appearing or toxic-appearing.   HENT:      Mouth/Throat:      Pharynx: No posterior oropharyngeal erythema.   Eyes:      Conjunctiva/sclera: Conjunctivae normal.      Pupils: Pupils are equal, round, and reactive to light.   Cardiovascular:      Rate and Rhythm: Normal rate and regular rhythm.   Pulmonary:      Effort: Pulmonary effort is normal. No respiratory distress.   Abdominal:      General: Abdomen is flat. There is no distension.      Palpations: There  is no mass.      Tenderness: There is no abdominal tenderness. There is right CVA tenderness. There is no guarding or rebound.   Musculoskeletal:         General: No deformity. Normal range of motion.   Skin:     General: Skin is warm.      Findings: No rash.   Neurological:      General: No focal deficit present.      Mental Status: She is alert and oriented to person, place, and time.      Motor: No weakness.         Procedures           ED Course  ED Course as of 03/21/24 0634   u Mar 21, 2024   0516 BP: 170/88 [JK]   0516 Temp: 98.8 °F (37.1 °C) [JK]   0516 Heart Rate: 93 [JK]   0516 Resp: 14 [JK]   0516 SpO2: 100 %  Interpretation:  Patient's repeat vitals, telemetry tracing, and pulse oximetry tracing were directly viewed and interpreted by myself.  Normal sinus rhythm [JK]   0633 Urinalysis With Culture If Indicated - Urine, Clean Catch(!) [JK]   0633 Urinalysis, Microscopic Only - Urine, Clean Catch(!) [JK]   0633 Lactic Acid, Plasma [JK]   0633 Comprehensive Metabolic Panel(!) [JK]   0633 Lipase(!) [JK]   0633 CBC & Differential(!) [JK]   0633 Urinalysis With Microscopic If Indicated (No Culture) - Urine, Clean Catch(!)  Interpretation:  Laboratory studies were reviewed and interpreted directly by myself.  CBC was unremarkable, lipase marginal 76, CMP showed some minor acute kidney injury with BUN of 35 and creatinine 1.06, lactic was normal, urinalysis showed diffuse white cells as well as 4+ bacteria [JK]   0633 CT Abdomen Pelvis Without Contrast  Interpretation:  Imaging was directly visualized by myself, per my interpretations, CT of the abdomen pelvis was unremarkable. [JK]   0633 On reevaluation, the patient states that they are feeling much better.  Patient tolerated by mouth challenge of juice and crackers without difficulty.  They are not complaining of any new abdominal pain.  Repeat examination did not show any significant guarding or rebound.  No evidence of peritonitis.  No acute no  tenderness.  Patient was given strict return precautions for acute abdomen.  They need to be reevaluated within 24 hours for acute abdomen by PCP or return to the emergency department for reexamination.   [JK]   9651 I had a discussion with the patient/family regarding diagnosis, diagnostic results, treatment plan, and medications. The patient/family indicated understanding of these instructions. I spent adequate time at the bedside prior to discharge necessary to discuss the aftercare instructions, giving patient education, providing explanations of the results of our evaluations/findings, and my decision making to assure that the patient/family understand the plan of care. Time was allotted to answer questions at that time and throughout the ED course. Patient is required to maintain timely follow up, as discussed. I also discussed the potential for the development of an acute emergent condition requiring further evaluation, return to the ER, admission, or even surgical intervention.  I encouraged the patient to return to the emergency department immediately for any concerns, worsening symptoms, new complaints, or if symptoms persist and they are unable to seek follow-up in a timely fashion. The patient/family expressed understanding and agreement with this plan    Shared decision making:   After full review of the patient's clinical presentation, review of any work-up including but not limited to laboratory studies and radiology obtained, I had a discussion with the patient.  Treatment options were discussed as well as the risks, benefits and consequences.  I discussed all findings with the patient and family members if available.  During the discussion, treatment goals were understood by all as well as any misconceptions which were addressed with the patient.  Ample time was given for any questions they may have had.  They are in agreement with the treatment plan as well as final disposition. [JK]      ED Course  User Index  [JK] Juan Carlos Simpson MD                                             Medical Decision Making  This is a 59-year-old female with a history of diabetes presented to the emergency department with some dysuria and right flank pain.  Patient symptoms are concerning for UTI, possibly pyelonephritis or nephrolithiasis.  She does have some tenderness on exam, however there is no evidence of acute abdomen or peritonitis.  Overall the patient has nontoxic-appearing.  She is afebrile.  Hemodynamically stable.  IV access will be established and the patient.  Patient placed on continuous telemetry monitoring.  Provided symptomatic treatment.  Workup initiated.      Differential diagnosis: Peptic ulcer disease, dyspepsia, GERD, pancreatitis, biliary colic, electrolyte abnormality, acute kidney injury, UTI, mild nephrolithiasis      Amount and/or Complexity of Data Reviewed  Independent Historian: EMS  External Data Reviewed: labs, radiology and notes.     Details: External laboratories, imaging as well as notes were reviewed personally by myself.  All relevant studies were used to guide decision making.     Date of previous record: 3/7/2024    Source of note: Primary care physician    Summary: Patient was seen for UTI.  I did review previous urinalysis as well as culture.  Chest x-ray and CT abdomen pelvis on file reviewed.    Labs: ordered. Decision-making details documented in ED Course.  Radiology: ordered and independent interpretation performed. Decision-making details documented in ED Course.    Risk  Prescription drug management.        Final diagnoses:   Acute pyelonephritis       ED Disposition  ED Disposition       ED Disposition   Discharge    Condition   Stable    Comment   --               Chris Hogan PA-C  [address] [address]  [phone]    Call in 1 day           Medication List        New Prescriptions      cefdinir 300 MG capsule  Commonly known as:  OMNICEF  Take 1 capsule by mouth 2 (Two) Times a Day for 7 days.               Where to Get Your Medications        These medications were sent to [facility] - [address] - [phone]  - [phone]  [place] [address]      Phone: [phone]   cefdinir 300 MG capsule            Juan Carlos Simpson MD  03/21/24 2734

## 2024-03-22 RX ORDER — ATENOLOL 50 MG/1
TABLET ORAL
Qty: 90 TABLET | Refills: 0 | Status: SHIPPED | OUTPATIENT
Start: 2024-03-22

## 2024-03-22 RX ORDER — PHENAZOPYRIDINE HYDROCHLORIDE 100 MG/1
100 TABLET, FILM COATED ORAL 3 TIMES DAILY PRN
Qty: 10 TABLET | Refills: 0 | Status: SHIPPED | OUTPATIENT
Start: 2024-03-22

## 2024-03-22 NOTE — TELEPHONE ENCOUNTER
Caller: Marta Polanco    Relationship: Self    Best call back number: [phone]     Requested Prescriptions:   Requested Prescriptions     Pending Prescriptions Disp Refills    phenazopyridine (PYRIDIUM) 100 MG tablet 10 tablet 0     Sig: Take 1 tablet by mouth 3 (Three) Times a Day As Needed for Bladder Spasms.        Pharmacy where request should be sent: [facility] [place], [place] - [address] - [phone]  - [phone] FX     Last office visit with prescribing clinician: 3/7/2024   Last telemedicine visit with prescribing clinician: Visit date not found   Next office visit with prescribing clinician: 3/25/2024     Additional details provided by patient:   PATIENT IS REQUESTING A REFILL ON PROMETHAZINE THAT IS NOT ON HER MEDICATION LIST. SHE STATED THAT THE EMERGENCY ROOM GAVE HER AN ANTIBIOTIC.    SHE IS OUT OF MEDICATION.    Does the patient have less than a 3 day supply:  [x] Yes  [] No    Would you like a call back once the refill request has been completed: [x] Yes [] No    If the office needs to give you a call back, can they leave a voicemail:[x]  Yes [] No    Alina Zheng Rep   03/22/24 09:51 EDT

## 2024-03-23 LAB — BACTERIA SPEC AEROBE CULT: ABNORMAL

## 2024-03-25 ENCOUNTER — TELEPHONE (OUTPATIENT)
Dept: INTERNAL MEDICINE | Facility: CLINIC | Age: 59
End: 2024-03-25

## 2024-03-25 RX ORDER — NYSTATIN 100000 [USP'U]/G
POWDER TOPICAL 3 TIMES DAILY
Qty: 60 G | Refills: 1 | Status: SHIPPED | OUTPATIENT
Start: 2024-03-25 | End: 2024-04-01

## 2024-03-25 RX ORDER — IBUPROFEN 600 MG/1
600 TABLET ORAL EVERY 8 HOURS PRN
Qty: 90 TABLET | Refills: 0 | Status: SHIPPED | OUTPATIENT
Start: 2024-03-25

## 2024-03-25 RX ORDER — TIZANIDINE 4 MG/1
4 TABLET ORAL EVERY 8 HOURS PRN
Qty: 90 TABLET | Refills: 0 | Status: SHIPPED | OUTPATIENT
Start: 2024-03-25 | End: 2024-04-01

## 2024-03-25 RX ORDER — LISINOPRIL 5 MG/1
5 TABLET ORAL DAILY
Qty: 30 TABLET | Refills: 0 | OUTPATIENT
Start: 2024-03-25

## 2024-03-25 RX ORDER — TIZANIDINE 4 MG/1
4 TABLET ORAL EVERY 8 HOURS PRN
Qty: 90 TABLET | Refills: 0 | OUTPATIENT
Start: 2024-03-25

## 2024-03-25 RX ORDER — LISINOPRIL 5 MG/1
5 TABLET ORAL DAILY
Qty: 90 TABLET | Refills: 1 | Status: SHIPPED | OUTPATIENT
Start: 2024-03-25 | End: 2024-04-01

## 2024-03-25 NOTE — TELEPHONE ENCOUNTER
Caller: Marta Polanco    Relationship: Self    Best call back number:      [phone] (Home)     Requested Prescriptions:     NYAMYC POWDER      ibuprofen (ADVIL,MOTRIN) 800 MG tablet     Pharmacy where request should be sent:     [facility] [place], [place] - [address] - [phone]  - [phone] FX     Last office visit with prescribing clinician: 3/7/2024   Last telemedicine visit with prescribing clinician: Visit date not found   Next office visit with prescribing clinician: Visit date not found     Additional details provided by patient:     PATIENT STATED SHE IS OUT OF THE POWDER AND IBUPROFEN    PATIENT STATED THE IBUPROFEN HAD BEEN PRESCRIBED WHILE AT THE EMERGENCY ROOM    Does the patient have less than a 3 day supply:  [x] Yes  [] No    Would you like a call back once the refill request has been completed: [] Yes [] No    If the office needs to give you a call back, can they leave a voicemail: [] Yes [] No    Alina Frank Rep   03/25/24 11:55 EDT

## 2024-04-01 ENCOUNTER — OFFICE VISIT (OUTPATIENT)
Dept: INTERNAL MEDICINE | Facility: CLINIC | Age: 59
End: 2024-04-01
Payer: COMMERCIAL

## 2024-04-01 ENCOUNTER — LAB (OUTPATIENT)
Dept: INTERNAL MEDICINE | Facility: CLINIC | Age: 59
End: 2024-04-01
Payer: COMMERCIAL

## 2024-04-01 VITALS
HEART RATE: 84 BPM | WEIGHT: 169 LBS | BODY MASS INDEX: 28.16 KG/M2 | OXYGEN SATURATION: 98 % | TEMPERATURE: 97.1 F | HEIGHT: 65 IN | DIASTOLIC BLOOD PRESSURE: 72 MMHG | SYSTOLIC BLOOD PRESSURE: 140 MMHG

## 2024-04-01 DIAGNOSIS — N39.0 ACUTE UTI: Primary | ICD-10-CM

## 2024-04-01 DIAGNOSIS — Z12.31 ENCOUNTER FOR SCREENING MAMMOGRAM FOR MALIGNANT NEOPLASM OF BREAST: ICD-10-CM

## 2024-04-01 DIAGNOSIS — Z11.1 SCREENING-PULMONARY TB: ICD-10-CM

## 2024-04-01 DIAGNOSIS — E11.9 TYPE 2 DIABETES MELLITUS WITHOUT COMPLICATION, WITH LONG-TERM CURRENT USE OF INSULIN: ICD-10-CM

## 2024-04-01 DIAGNOSIS — I10 PRIMARY HYPERTENSION: ICD-10-CM

## 2024-04-01 DIAGNOSIS — G43.909 MIGRAINE SYNDROME: ICD-10-CM

## 2024-04-01 DIAGNOSIS — R94.4 DECREASED GFR: Primary | ICD-10-CM

## 2024-04-01 DIAGNOSIS — R94.4 DECREASED GFR: ICD-10-CM

## 2024-04-01 DIAGNOSIS — Z79.4 TYPE 2 DIABETES MELLITUS WITHOUT COMPLICATION, WITH LONG-TERM CURRENT USE OF INSULIN: ICD-10-CM

## 2024-04-01 LAB
BILIRUB BLD-MCNC: NEGATIVE MG/DL
CLARITY, POC: CLEAR
COLOR UR: YELLOW
EXPIRATION DATE: ABNORMAL
GLUCOSE UR STRIP-MCNC: ABNORMAL MG/DL
KETONES UR QL: NEGATIVE
LEUKOCYTE EST, POC: ABNORMAL
Lab: ABNORMAL
NITRITE UR-MCNC: NEGATIVE MG/ML
PH UR: 6 [PH] (ref 5–8)
PROT UR STRIP-MCNC: ABNORMAL MG/DL
RBC # UR STRIP: ABNORMAL /UL
SP GR UR: 1.01 (ref 1–1.03)
UROBILINOGEN UR QL: NORMAL

## 2024-04-01 PROCEDURE — 87086 URINE CULTURE/COLONY COUNT: CPT | Performed by: PHYSICIAN ASSISTANT

## 2024-04-01 PROCEDURE — 87077 CULTURE AEROBIC IDENTIFY: CPT | Performed by: PHYSICIAN ASSISTANT

## 2024-04-01 PROCEDURE — 87186 SC STD MICRODIL/AGAR DIL: CPT | Performed by: PHYSICIAN ASSISTANT

## 2024-04-01 PROCEDURE — 36415 COLL VENOUS BLD VENIPUNCTURE: CPT | Performed by: PHYSICIAN ASSISTANT

## 2024-04-01 RX ORDER — LISINOPRIL 10 MG/1
10 TABLET ORAL DAILY
Qty: 30 TABLET | Refills: 1 | Status: SHIPPED | OUTPATIENT
Start: 2024-04-01

## 2024-04-01 RX ORDER — PHENAZOPYRIDINE HYDROCHLORIDE 100 MG/1
100 TABLET, FILM COATED ORAL 3 TIMES DAILY PRN
Qty: 10 TABLET | Refills: 0 | Status: SHIPPED | OUTPATIENT
Start: 2024-04-01

## 2024-04-01 RX ORDER — PROMETHAZINE HYDROCHLORIDE 25 MG/1
25 SUPPOSITORY RECTAL EVERY 6 HOURS PRN
Qty: 12 SUPPOSITORY | Refills: 0 | Status: SHIPPED | OUTPATIENT
Start: 2024-04-01 | End: 2024-04-08

## 2024-04-01 RX ORDER — NITROFURANTOIN 25; 75 MG/1; MG/1
100 CAPSULE ORAL 2 TIMES DAILY
Qty: 10 CAPSULE | Refills: 0 | Status: SHIPPED | OUTPATIENT
Start: 2024-04-01 | End: 2024-04-06

## 2024-04-01 NOTE — PROGRESS NOTES
[facility]  [address]  [facility] [place] [facility] [address]  Dept: [phone]  Dept Fax: [phone]  Loc: [phone]  Loc Fax: [phone]    Marta Polanco  1965    Follow Up Office Visit Note    History of Present Illness:  Patient is a 59-year-old female in today for painful urination and to follow-up for high blood pressure, migraines, and diabetes.  Recent blood work results reviewed with patient in office showing A1c vastly uncontrolled and decreased kidney function.  Agreeable to endocrinology and nephrology referrals respectively.  Patient on insulin nightly as directed.    Patient continues to have burning with urination.  Finished a round of antibiotics recently.  Still having symptoms.    On Nurtec for migraines.  Overall headaches doing better.    On lisinopril 5 mg and atenolol 50 mg daily.  Taking as directed but blood pressure still high.  Denies any chest pain or headache.            The following portions of the patient's history were reviewed and updated as appropriate: allergies, current medications, past family history, past medical history, past social history, past surgical history, and problem list.    Medications:    Current Outpatient Medications:     lisinopril (PRINIVIL,ZESTRIL) 10 MG tablet, Take 1 tablet by mouth Daily., Disp: 30 tablet, Rfl: 1    phenazopyridine (PYRIDIUM) 100 MG tablet, Take 1 tablet by mouth 3 (Three) Times a Day As Needed for Bladder Spasms., Disp: 10 tablet, Rfl: 0    atenolol (TENORMIN) 50 MG tablet, TAKE 1 TABLET BY MOUTH DAILY., Disp: 90 tablet, Rfl: 0    atorvastatin (LIPITOR) 10 MG tablet, TAKE 1 TABLET BY MOUTH EVERY NIGHT., Disp: 30 tablet, Rfl: 4    buprenorphine-naloxone (SUBOXONE) 8-2 MG per SL tablet, , Disp: , Rfl:     Cholecalciferol (Vitamin D3) 50 MCG (2000 UT) capsule, TAKE 1 TABLET BY MOUTH DAILY., Disp: 90 capsule, Rfl: 0    clotrimazole (LOTRIMIN) 1 % cream, Apply  topically to the  appropriate area as directed 2 (Two) Times a Day., Disp: 85 g, Rfl: 1    fluconazole (DIFLUCAN) 100 MG tablet, Take 1 tablet by mouth daily for 2 doses. If not resolved, complete the remaining 3 tablets - 1 tablet by mouth daily. 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, Disp: 5 tablet, Rfl: 0    hydrOXYzine (ATARAX) 50 MG tablet, TAKE 1 TABLET BY MOUTH EVERY 8 (EIGHT) HOURS AS NEEDED FOR ITCHING., Disp: 270 tablet, Rfl: 0    ibuprofen (ADVIL,MOTRIN) 600 MG tablet, Take 1 tablet by mouth Every 8 (Eight) Hours As Needed for Moderate Pain., Disp: 90 tablet, Rfl: 0    insulin detemir (Levemir FlexPen) 100 UNIT/ML injection, Inject 18 Units under the skin into the appropriate area as directed Every Night., Disp: 15 mL, Rfl: 11    Insulin Glargine (LANTUS SOLOSTAR) 100 UNIT/ML injection pen, Inject 18 Units under the skin into the appropriate area as directed Every Night., Disp: 100 mL, Rfl: 3    Insulin Lispro, 1 Unit Dial, (HUMALOG) 100 UNIT/ML solution pen-injector, Inject into skin 3 times daily with meals as directed per sliding scale provided by provider. Do not exceed 40 units in 24 hours., Disp: 15 mL, Rfl: 11    Insulin Pen Needle 31G X 8 MM misc, Use 1 each Daily., Disp: 100 each, Rfl: 2    Lancets (Safety Lancet 30G/Pressure Act) misc, , Disp: , Rfl:     miconazole (MICOTIN) 2 % cream, Apply 1 application topically to the appropriate area as directed Every 12 (Twelve) Hours., Disp: , Rfl:     naloxone (NARCAN) 4 MG/0.1ML nasal spray, 1 spray into the nostril(s) as directed by provider As Needed., Disp: , Rfl:     nitrofurantoin, macrocrystal-monohydrate, (Macrobid) 100 MG capsule, Take 1 capsule by mouth 2 (Two) Times a Day for 5 days., Disp: 10 capsule, Rfl: 0    nystatin (MYCOSTATIN) 416494 UNIT/GM powder, Apply  topically to the appropriate area as directed 3 (Three) Times a Day for 7 days., Disp: 60 g, Rfl: 1    pantoprazole (PROTONIX) 40 MG EC tablet, Take 1 tablet by mouth  Daily., Disp: 90 tablet, Rfl: 1    promethazine (PHENERGAN) 25 MG suppository, Insert 1 suppository into the rectum Every 6 (Six) Hours As Needed for Nausea or Vomiting., Disp: 12 suppository, Rfl: 0    sertraline (ZOLOFT) 100 MG tablet, Take 1 tablet by mouth Daily., Disp: 90 tablet, Rfl: 1    SUMAtriptan (IMITREX) 100 MG tablet, Take 1/2 to 1 tablet at onset of headache. May repeat dose one time in 2 hours as needed. Do not exceed 200 mg in 24 hours., Disp: 9 tablet, Rfl: 5    Subjective  Allergies   Allergen Reactions    Ambien [Zolpidem] Other (See Comments)    Ketorolac Hives and Shortness Of Breath    Ketorolac Tromethamine Anaphylaxis    Tetracycline Rash    Tramadol Hives and Shortness Of Breath    Sumatriptan Dizziness    Sulfa Antibiotics Hives    Morphine Rash    Ondansetron Headache    Penicillins Rash        Past Medical History:   Diagnosis Date    Anxiety 90    I have PTSD. Started after my sister was murder    Colon polyp 22    Depression 89    Diabetes mellitus 22    GERD (gastroesophageal reflux disease)     Headache     Hypertension     Low back pain     Neuromuscular disorder     Pancreatitis     In the hospital twice for this    Substance abuse 95    Im clean and sober now    Urinary tract infection 11/15/22    Visual impairment 3/4/99       Past Surgical History:   Procedure Laterality Date    APPENDECTOMY  84     SECTION  4/10/84 2/3/86    Had 2 daughter's    CHOLECYSTECTOMY  84    COLONOSCOPY  20    HERNIA REPAIR  78    HYSTERECTOMY  96       Family History   Problem Relation Age of Onset    Hypertension Father         Social History     Socioeconomic History    Marital status:    Tobacco Use    Smoking status: Every Day     Current packs/day: 1.00     Average packs/day: 1 pack/day for 15.0 years (15.0 ttl pk-yrs)     Types: Cigarettes, Electronic Cigarette    Smokeless tobacco: Never   Vaping Use    Vaping status: Never Used  "  Substance and Sexual Activity    Alcohol use: Never    Drug use: Not Currently     Types: Barbiturates, Benzodiazepines, Heroin, Marijuana    Sexual activity: Not Currently     Partners: Male     Birth control/protection: None, Hysterectomy       Review of Systems   Constitutional:  Negative for activity change, chills, fatigue, fever and unexpected weight change.   HENT:  Negative for congestion, ear pain, postnasal drip, sinus pressure and sore throat.    Eyes:  Negative for pain, discharge and redness.   Respiratory:  Negative for cough, shortness of breath and wheezing.    Cardiovascular:  Negative for chest pain, palpitations and leg swelling.   Gastrointestinal:  Positive for nausea and vomiting. Negative for diarrhea.   Endocrine: Negative for cold intolerance and heat intolerance.   Genitourinary:  Positive for dysuria and frequency. Negative for decreased urine volume.   Musculoskeletal:  Negative for arthralgias and myalgias.   Skin:  Negative for rash and wound.   Neurological:  Negative for dizziness, light-headedness and headaches.   Hematological:  Does not bruise/bleed easily.   Psychiatric/Behavioral:  Positive for sleep disturbance. Negative for confusion and dysphoric mood. The patient is not nervous/anxious.          Objective  Vitals:    04/01/24 1006   BP: 140/72   BP Location: Left arm   Patient Position: Sitting   Cuff Size: Large Adult   Pulse: 84   Temp: 97.1 °F (36.2 °C)   TempSrc: Temporal   SpO2: 98%   Weight: 76.7 kg (169 lb)   Height: 165.1 cm (65\")     Body mass index is 28.12 kg/m².     Physical Exam  Physical Exam  Vitals and nursing note reviewed.   Constitutional:       General: She is not in acute distress.     Appearance: She is not ill-appearing.   HENT:      Head: Normocephalic.      Right Ear: Tympanic membrane, ear canal and external ear normal. There is no impacted cerumen.      Left Ear: Tympanic membrane, ear canal and external ear normal. There is no impacted cerumen. "      Nose: No congestion or rhinorrhea.      Mouth/Throat:      Mouth: Mucous membranes are moist.      Pharynx: Oropharynx is clear. No oropharyngeal exudate or posterior oropharyngeal erythema.   Eyes:      General:         Right eye: No discharge.         Left eye: No discharge.      Extraocular Movements: Extraocular movements intact.      Conjunctiva/sclera: Conjunctivae normal.      Pupils: Pupils are equal, round, and reactive to light.   Cardiovascular:      Rate and Rhythm: Normal rate and regular rhythm.      Heart sounds: Normal heart sounds. No murmur heard.     No friction rub. No gallop.   Pulmonary:      Effort: Pulmonary effort is normal. No respiratory distress.      Breath sounds: Normal breath sounds. No wheezing.   Abdominal:      General: Bowel sounds are normal. There is no distension.      Palpations: Abdomen is soft. There is no mass.      Tenderness: There is no abdominal tenderness.   Musculoskeletal:         General: No swelling.      Cervical back: Normal range of motion. No tenderness.      Right lower leg: No edema.      Left lower leg: No edema.   Lymphadenopathy:      Cervical: No cervical adenopathy.   Skin:     Findings: No bruising, erythema or rash.   Neurological:      Mental Status: She is oriented to person, place, and time.      Gait: Gait abnormal.   Psychiatric:         Mood and Affect: Mood normal.         Behavior: Behavior normal.         Thought Content: Thought content normal.         Judgment: Judgment normal.         Diagnostic Data  Procedures    Assessment  Diagnoses and all orders for this visit:    1. Acute UTI (Primary)  -     POCT urinalysis dipstick, automated  -     Urine Culture - Urine, Urine, Clean Catch    2. Migraine syndrome    3. Primary hypertension    4. Decreased GFR  -     Ambulatory Referral to Endocrinology    5. Type 2 diabetes mellitus without complication, with long-term current use of insulin  -     Ambulatory Referral to Nephrology    6.  Screening-pulmonary TB  -     QuantiFERON TB Gold; Future    Other orders  -     lisinopril (PRINIVIL,ZESTRIL) 10 MG tablet; Take 1 tablet by mouth Daily.  Dispense: 30 tablet; Refill: 1  -     promethazine (PHENERGAN) 25 MG suppository; Insert 1 suppository into the rectum Every 6 (Six) Hours As Needed for Nausea or Vomiting.  Dispense: 12 suppository; Refill: 0  -     nitrofurantoin, macrocrystal-monohydrate, (Macrobid) 100 MG capsule; Take 1 capsule by mouth 2 (Two) Times a Day for 5 days.  Dispense: 10 capsule; Refill: 0  -     phenazopyridine (PYRIDIUM) 100 MG tablet; Take 1 tablet by mouth 3 (Three) Times a Day As Needed for Bladder Spasms.  Dispense: 10 tablet; Refill: 0        Plan    1. Acute UTI (Primary)- Macrobid preemptively.  Obtain culture and sensitivity.    2. Migraine syndrome- on Nurtec.    3. Primary hypertension- uncontrolled on lisinopril and atenolol.  Increase lisinopril to 10 mg daily. Advised patient to take blood pressure readings at home 2-3 times daily. Advised if blood pressure higher than 160/100 or lower than 100/60 to call the office immediately. If symptomatic, go to the ER. Patient verbalized understanding of all instructions given and complied.    4. Decreased GFR- Ambulatory Referral to Endocrinology    5. Type 2 diabetes mellitus without complication, with long-term current use of insulin- vastly uncontrolled.  Increase nightly long-acting insulin to 18 units nightly.  Advised to continue to monitor blood sugars at home 3 times daily and if running higher than 400 or lower than 60 and/or symptomatic call 911 immediately.  Report readings back to clinic in 2 weeks. Patient verbalized understanding of all instructions given and complied.     6. Screening-pulmonary TB- ordered QuantiFERON TB Gold.      Return in about 2 weeks (around 4/15/2024) for Recheck.    Chris Hogan PA-C  04/01/2024

## 2024-04-02 PROCEDURE — 36415 COLL VENOUS BLD VENIPUNCTURE: CPT

## 2024-04-02 PROCEDURE — 86480 TB TEST CELL IMMUN MEASURE: CPT | Performed by: PHYSICIAN ASSISTANT

## 2024-04-03 LAB
GAMMA INTERFERON BACKGROUND BLD IA-ACNC: 0.17 IU/ML
M TB IFN-G BLD-IMP: NEGATIVE
M TB IFN-G CD4+ BCKGRND COR BLD-ACNC: 0.14 IU/ML
M TB IFN-G CD4+CD8+ BCKGRND COR BLD-ACNC: 0.05 IU/ML
MITOGEN IGNF BCKGRD COR BLD-ACNC: >10 IU/ML
SERVICE CMNT-IMP: NORMAL

## 2024-04-04 LAB — BACTERIA SPEC AEROBE CULT: ABNORMAL

## 2024-04-08 ENCOUNTER — TELEPHONE (OUTPATIENT)
Dept: INTERNAL MEDICINE | Facility: CLINIC | Age: 59
End: 2024-04-08

## 2024-04-08 RX ORDER — OMEPRAZOLE 40 MG/1
40 CAPSULE, DELAYED RELEASE ORAL DAILY
Qty: 90 CAPSULE | Refills: 0 | OUTPATIENT
Start: 2024-04-08

## 2024-04-09 RX ORDER — FLUCONAZOLE 100 MG/1
TABLET ORAL
Qty: 5 TABLET | Refills: 0 | Status: SHIPPED | OUTPATIENT
Start: 2024-04-09

## 2024-04-09 RX ORDER — PHENAZOPYRIDINE HYDROCHLORIDE 100 MG/1
100 TABLET, FILM COATED ORAL 3 TIMES DAILY PRN
Qty: 10 TABLET | Refills: 0 | Status: SHIPPED | OUTPATIENT
Start: 2024-04-09

## 2024-04-09 NOTE — TELEPHONE ENCOUNTER
Caller: NICKO    Relationship: SELF    Best call back number:[phone]    Requested Prescriptions:   Requested Prescriptions     Pending Prescriptions Disp Refills    fluconazole (DIFLUCAN) 100 MG tablet 5 tablet 0     Sig: Take 1 tablet by mouth daily for 2 doses. If not resolved, complete the remaining 3 tablets - 1 tablet by mouth daily. 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      OMEPRAZOLE 40 MG        Pharmacy where request should be sent: [address] - [phone]  - [phone] [facility]     Last office visit with prescribing clinician: 4/1/2024   Last telemedicine visit with prescribing clinician: Visit date not found   Next office visit with prescribing clinician: 4/25/2024     Additional details provided by patient: REQUESTING REFILLS     Does the patient have less than a 3 day supply:  [x] Yes  [] No    Would you like a call back once the refill request has been completed: [x] Yes [] No    If the office needs to give you a call back, can they leave a voicemail: [x] Yes [] No    Alina Weeks Rep   04/09/24 13:01 EDT         40 MG

## 2024-11-04 NOTE — TELEPHONE ENCOUNTER
Caller: Marta Polanco    Relationship: Self    Best call back number: [phone]     Which medication are you concerned about: promethazine (PHENERGAN) 25 MG tablet     Who prescribed you this medication:  Savage Ferrera MD     When did you start taking this medication: N/A    What are your concerns: PATIENT IS STILL EXPERIENCING DIARRHEA , NAUSEA AND DRY HEAVING .,    How long have you had these concerns: 2 DAYS    [facility]  [place] [address] [facility] - [phone] [facility] [phone]  [phone]     PATIENT IS STILL WAITING ON THIS MEDICATION TO BE CALLED IN. PATIENT HAS CHECK WITH [facility] [employer] AND NOTHING HAS BEEN RECEIVED. PATIENT HAD THE WRONG MEDICATION CALLED IN ON YESTERDAY    No